# Patient Record
Sex: FEMALE | Race: OTHER | HISPANIC OR LATINO | URBAN - METROPOLITAN AREA
[De-identification: names, ages, dates, MRNs, and addresses within clinical notes are randomized per-mention and may not be internally consistent; named-entity substitution may affect disease eponyms.]

---

## 2017-08-15 ENCOUNTER — OUTPATIENT (OUTPATIENT)
Dept: OUTPATIENT SERVICES | Facility: HOSPITAL | Age: 32
LOS: 1 days | Discharge: HOME | End: 2017-08-15

## 2017-08-15 DIAGNOSIS — E78.00 PURE HYPERCHOLESTEROLEMIA, UNSPECIFIED: ICD-10-CM

## 2017-08-15 DIAGNOSIS — Z00.00 ENCOUNTER FOR GENERAL ADULT MEDICAL EXAMINATION WITHOUT ABNORMAL FINDINGS: ICD-10-CM

## 2017-08-15 DIAGNOSIS — E03.9 HYPOTHYROIDISM, UNSPECIFIED: ICD-10-CM

## 2018-06-11 ENCOUNTER — OUTPATIENT (OUTPATIENT)
Dept: OUTPATIENT SERVICES | Facility: HOSPITAL | Age: 33
LOS: 1 days | Discharge: HOME | End: 2018-06-11

## 2018-06-11 DIAGNOSIS — R76.11 NONSPECIFIC REACTION TO TUBERCULIN SKIN TEST WITHOUT ACTIVE TUBERCULOSIS: ICD-10-CM

## 2018-07-17 ENCOUNTER — OUTPATIENT (OUTPATIENT)
Dept: OUTPATIENT SERVICES | Facility: HOSPITAL | Age: 33
LOS: 1 days | Discharge: HOME | End: 2018-07-17

## 2018-07-17 DIAGNOSIS — R76.11 NONSPECIFIC REACTION TO TUBERCULIN SKIN TEST WITHOUT ACTIVE TUBERCULOSIS: ICD-10-CM

## 2018-08-20 ENCOUNTER — OUTPATIENT (OUTPATIENT)
Dept: OUTPATIENT SERVICES | Facility: HOSPITAL | Age: 33
LOS: 1 days | Discharge: HOME | End: 2018-08-20

## 2018-08-20 DIAGNOSIS — R76.11 NONSPECIFIC REACTION TO TUBERCULIN SKIN TEST WITHOUT ACTIVE TUBERCULOSIS: ICD-10-CM

## 2018-09-19 ENCOUNTER — OUTPATIENT (OUTPATIENT)
Dept: OUTPATIENT SERVICES | Facility: HOSPITAL | Age: 33
LOS: 1 days | Discharge: HOME | End: 2018-09-19

## 2018-09-19 DIAGNOSIS — R76.11 NONSPECIFIC REACTION TO TUBERCULIN SKIN TEST WITHOUT ACTIVE TUBERCULOSIS: ICD-10-CM

## 2018-11-03 ENCOUNTER — OUTPATIENT (OUTPATIENT)
Dept: OUTPATIENT SERVICES | Facility: HOSPITAL | Age: 33
LOS: 1 days | Discharge: HOME | End: 2018-11-03

## 2018-11-03 DIAGNOSIS — R76.11 NONSPECIFIC REACTION TO TUBERCULIN SKIN TEST WITHOUT ACTIVE TUBERCULOSIS: ICD-10-CM

## 2018-12-08 ENCOUNTER — OUTPATIENT (OUTPATIENT)
Dept: OUTPATIENT SERVICES | Facility: HOSPITAL | Age: 33
LOS: 1 days | Discharge: HOME | End: 2018-12-08

## 2018-12-08 DIAGNOSIS — R76.11 NONSPECIFIC REACTION TO TUBERCULIN SKIN TEST WITHOUT ACTIVE TUBERCULOSIS: ICD-10-CM

## 2019-01-29 ENCOUNTER — OUTPATIENT (OUTPATIENT)
Dept: OUTPATIENT SERVICES | Facility: HOSPITAL | Age: 34
LOS: 1 days | Discharge: HOME | End: 2019-01-29

## 2019-01-29 DIAGNOSIS — R76.11 NONSPECIFIC REACTION TO TUBERCULIN SKIN TEST WITHOUT ACTIVE TUBERCULOSIS: ICD-10-CM

## 2019-03-20 ENCOUNTER — OUTPATIENT (OUTPATIENT)
Dept: OUTPATIENT SERVICES | Facility: HOSPITAL | Age: 34
LOS: 1 days | Discharge: HOME | End: 2019-03-20

## 2019-03-20 DIAGNOSIS — R03.0 ELEVATED BLOOD-PRESSURE READING, WITHOUT DIAGNOSIS OF HYPERTENSION: ICD-10-CM

## 2019-03-20 DIAGNOSIS — E55.9 VITAMIN D DEFICIENCY, UNSPECIFIED: ICD-10-CM

## 2022-10-18 ENCOUNTER — NON-APPOINTMENT (OUTPATIENT)
Age: 37
End: 2022-10-18

## 2022-10-18 ENCOUNTER — APPOINTMENT (OUTPATIENT)
Dept: ORTHOPEDIC SURGERY | Facility: CLINIC | Age: 37
End: 2022-10-18

## 2022-10-18 VITALS — WEIGHT: 152 LBS | HEIGHT: 62 IN | BODY MASS INDEX: 27.97 KG/M2

## 2022-10-18 PROBLEM — Z00.00 ENCOUNTER FOR PREVENTIVE HEALTH EXAMINATION: Status: ACTIVE | Noted: 2022-10-18

## 2022-10-18 PROCEDURE — 99072 ADDL SUPL MATRL&STAF TM PHE: CPT | Mod: ACP

## 2022-10-18 PROCEDURE — 99203 OFFICE O/P NEW LOW 30 MIN: CPT | Mod: ACP

## 2022-10-18 NOTE — ASSESSMENT
[FreeTextEntry1] :  At this time discussed with Dr. Marino.  Patient is to continue with Silvadene, dressing changes.  Recommending consult and follow-up with burn at [facility].  Referred to Dr. Bryson or Dr. Whitney.  Not working until follow-up with Burn.  Total temporary disability.\par \par   Patient was seen under the supervision of Dr. Marino.

## 2022-10-18 NOTE — IMAGING
[de-identified] :   Examination of the right wrist small circular superficial burn is noted on the volar radial wrist, with erythema.  No signs of infection.  It is approximately the size of a nickel.  Patient is able to actively flex and extend the wrist although with discomfort.  She is able to make a full fist.  Neurovascularly intact.  Full range of motion of the elbow.\par \par X-rays deferred
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
## 2022-10-20 ENCOUNTER — OUTPATIENT (OUTPATIENT)
Dept: OUTPATIENT SERVICES | Facility: HOSPITAL | Age: 37
LOS: 1 days | Discharge: HOME | End: 2022-10-20

## 2022-10-20 ENCOUNTER — APPOINTMENT (OUTPATIENT)
Dept: BURN CARE | Facility: CLINIC | Age: 37
End: 2022-10-20
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
## 2022-10-20 DIAGNOSIS — T23.271D BURN OF SECOND DEGREE OF RIGHT WRIST, SUBSEQUENT ENCOUNTER: ICD-10-CM

## 2022-10-20 DIAGNOSIS — T31.0 BURNS INVOLVING LESS THAN 10% OF BODY SURFACE: ICD-10-CM

## 2022-10-20 PROCEDURE — 99202 OFFICE O/P NEW SF 15 MIN: CPT
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
## 2022-10-20 NOTE — PHYSICAL EXAM
[Healing] : healing [Size%: ______] : Size: [unfilled]% [Infected?] : Infected: No [5] : 5 out of 10 [Abnormal] : abnormal [Medium] : medium [] : no [de-identified] : 2nd degree burn volar wrist 1x1cm--> healing -> local wound care \par \par follow up 1 week \par \par c/o pain with movement right 3rd finger--. OT/PT [TWNoteComboBox1] : bandaid
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
## 2022-10-20 NOTE — REASON FOR VISIT
[Initial] : initial visit [Were you admitted to the burn center at [facility]?] : not admitted to the burn center at [facility]

## 2022-10-27 ENCOUNTER — OUTPATIENT (OUTPATIENT)
Dept: OUTPATIENT SERVICES | Facility: HOSPITAL | Age: 37
LOS: 1 days | Discharge: HOME | End: 2022-10-27

## 2022-10-27 ENCOUNTER — APPOINTMENT (OUTPATIENT)
Dept: BURN CARE | Facility: CLINIC | Age: 37
End: 2022-10-27

## 2022-10-27 VITALS — OXYGEN SATURATION: 99 % | HEART RATE: 102 BPM | DIASTOLIC BLOOD PRESSURE: 85 MMHG | SYSTOLIC BLOOD PRESSURE: 123 MMHG

## 2022-10-27 DIAGNOSIS — T23.271S: ICD-10-CM
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
## 2022-10-27 DIAGNOSIS — M79.644 PAIN IN RIGHT FINGER(S): ICD-10-CM
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
## 2022-10-27 DIAGNOSIS — M25.531 PAIN IN RIGHT WRIST: ICD-10-CM

## 2022-10-27 PROCEDURE — 99212 OFFICE O/P EST SF 10 MIN: CPT
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
## 2022-10-27 NOTE — ASSESSMENT
[FreeTextEntry1] : 2nd degree burn volar wrist 1x1cm--> healed -> local wound care \par \par follow up 1 week \par \par c/o pain with movement right 3rd finger--. OT/PT [Wound Care] : wound care

## 2022-10-27 NOTE — REASON FOR VISIT
[Revisit] : revisit [Were you admitted to the burn center at [facility]?] : not admitted to the burn center at [facility]

## 2022-10-27 NOTE — PHYSICAL EXAM
[Closed] : closed [Size%: ______] : Size: [unfilled]% [Infected?] : Infected: No [5] : 5 out of 10 [Abnormal] : abnormal [None] : none [] : no [de-identified] : therapy [de-identified] : 2nd degree burn volar wrist 1x1cm--> healed -> local wound care \par \par follow up 1 week \par \par c/o pain with movement right 3rd finger--. OT/PT [TWNoteComboBox1] : False

## 2022-11-03 ENCOUNTER — OUTPATIENT (OUTPATIENT)
Dept: OUTPATIENT SERVICES | Facility: HOSPITAL | Age: 37
LOS: 1 days | Discharge: HOME | End: 2022-11-03

## 2022-11-03 ENCOUNTER — APPOINTMENT (OUTPATIENT)
Dept: BURN CARE | Facility: CLINIC | Age: 37
End: 2022-11-03
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
## 2022-11-03 DIAGNOSIS — T23.271D BURN OF SECOND DEGREE OF RIGHT WRIST, SUBSEQUENT ENCOUNTER: ICD-10-CM
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
## 2022-11-03 DIAGNOSIS — M79.644 PAIN IN RIGHT FINGER(S): ICD-10-CM

## 2022-11-03 PROCEDURE — 99212 OFFICE O/P EST SF 10 MIN: CPT

## 2022-11-03 NOTE — PHYSICAL EXAM
[Closed] : closed [Size%: ______] : Size: [unfilled]% [Infected?] : Infected: No [5] : 5 out of 10 [Abnormal] : abnormal [Small] : small  [] : no [de-identified] : therapy/ neurology [de-identified] : 2nd degree burn volar wrist 1x1cm--> healed -> local wound care \par \par follow up 2 week \par \par c/o pain with movement right 3rd finger--. OT/PT--> neurology [TWNoteComboBox1] : bandaid

## 2022-11-03 NOTE — ASSESSMENT
[FreeTextEntry1] : 2nd degree burn volar wrist 1x1cm--> healed -> local wound care \par \par follow up 2 week \par \par c/o pain with movement right 3rd finger--. OT/PT--> neurology [Wound Care] : wound care

## 2022-11-03 NOTE — REASON FOR VISIT
[Revisit] : revisit [Were you admitted to the burn center at [facility]?] : not admitted to the burn center at [facility]
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
## 2022-11-17 ENCOUNTER — APPOINTMENT (OUTPATIENT)
Dept: BURN CARE | Facility: CLINIC | Age: 37
End: 2022-11-17

## 2022-11-17 ENCOUNTER — OUTPATIENT (OUTPATIENT)
Dept: OUTPATIENT SERVICES | Facility: HOSPITAL | Age: 37
LOS: 1 days | Discharge: HOME | End: 2022-11-17

## 2022-11-17 VITALS
OXYGEN SATURATION: 98 % | SYSTOLIC BLOOD PRESSURE: 141 MMHG | DIASTOLIC BLOOD PRESSURE: 95 MMHG | HEIGHT: 62 IN | HEART RATE: 98 BPM

## 2022-11-17 DIAGNOSIS — T23.271S: ICD-10-CM
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
## 2022-11-17 DIAGNOSIS — M79.644 PAIN IN RIGHT FINGER(S): ICD-10-CM
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
## 2022-11-17 DIAGNOSIS — M25.531 PAIN IN RIGHT WRIST: ICD-10-CM
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
## 2022-11-17 PROCEDURE — 99213 OFFICE O/P EST LOW 20 MIN: CPT
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
## 2022-11-17 NOTE — PHYSICAL EXAM
[Closed] : closed [Size%: ______] : Size: [unfilled]% [Infected?] : Infected: No [5] : 5 out of 10 [Abnormal] : abnormal [None] : none [] : no [de-identified] : therapy/ neurology [de-identified] : 2nd degree burn volar wrist 1x1cm--> healed -> local wound care \par \par follow up 4 week \par \par c/o pain with movement right 3rd finger--. OT/PT--> neurology\par \par as per pt -> emg with neurology to follow\par \par follow up 1-2 months  [TWNoteComboBox1] : False

## 2022-11-17 NOTE — REASON FOR VISIT
[Revisit] : revisit [Were you admitted to the burn center at [facility]?] : not admitted to the burn center at [facility]

## 2022-12-15 ENCOUNTER — OUTPATIENT (OUTPATIENT)
Dept: OUTPATIENT SERVICES | Facility: HOSPITAL | Age: 37
LOS: 1 days | Discharge: HOME | End: 2022-12-15

## 2022-12-15 ENCOUNTER — APPOINTMENT (OUTPATIENT)
Dept: BURN CARE | Facility: CLINIC | Age: 37
End: 2022-12-15

## 2022-12-15 VITALS — SYSTOLIC BLOOD PRESSURE: 156 MMHG | HEART RATE: 92 BPM | DIASTOLIC BLOOD PRESSURE: 96 MMHG

## 2022-12-15 DIAGNOSIS — T31.0 BURNS INVOLVING LESS THAN 10% OF BODY SURFACE: ICD-10-CM

## 2022-12-15 DIAGNOSIS — L91.8 OTHER HYPERTROPHIC DISORDERS OF THE SKIN: ICD-10-CM

## 2022-12-15 DIAGNOSIS — T23.271S: ICD-10-CM
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
## 2022-12-15 DIAGNOSIS — M25.531 PAIN IN RIGHT WRIST: ICD-10-CM

## 2022-12-15 DIAGNOSIS — M79.644 PAIN IN RIGHT FINGER(S): ICD-10-CM

## 2022-12-15 PROCEDURE — 99212 OFFICE O/P EST SF 10 MIN: CPT
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
## 2022-12-15 NOTE — REASON FOR VISIT
[Revisit] : revisit [Were you admitted to the burn center at [facility]?] : not admitted to the burn center at [facility]

## 2022-12-15 NOTE — PHYSICAL EXAM
[Closed] : closed [Size%: ______] : Size: [unfilled]% [Infected?] : Infected: No [5] : 5 out of 10 [Abnormal] : abnormal [None] : none [] : no [de-identified] : therapy/ neurology [de-identified] : The 1% TBSA 2nd degree burn volar wrist measures 1x1cm and is healed.  The extremity is warm and has no edema. There are areas of hypopigmentation.  The patient was instructed to clean  the wound with soap and water. Continue local wound care with moisturizer. Follow up neurology.  Follow up 1-2 months.
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
## 2023-01-26 ENCOUNTER — APPOINTMENT (OUTPATIENT)
Dept: BURN CARE | Facility: CLINIC | Age: 38
End: 2023-01-26
Payer: OTHER MISCELLANEOUS

## 2023-01-26 ENCOUNTER — OUTPATIENT (OUTPATIENT)
Dept: OUTPATIENT SERVICES | Facility: HOSPITAL | Age: 38
LOS: 1 days | Discharge: HOME | End: 2023-01-26

## 2023-01-26 VITALS — DIASTOLIC BLOOD PRESSURE: 106 MMHG | HEART RATE: 94 BPM | SYSTOLIC BLOOD PRESSURE: 147 MMHG

## 2023-01-26 PROCEDURE — 99212 OFFICE O/P EST SF 10 MIN: CPT

## 2023-01-26 NOTE — REASON FOR VISIT
[Revisit] : revisit [Were you admitted to the burn center at [facility]?] : not admitted to the burn center at [facility]
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
## 2023-01-26 NOTE — PHYSICAL EXAM
[Closed] : closed [Size%: ______] : Size: [unfilled]% [Infected?] : Infected: No [5] : 5 out of 10 [Abnormal] : abnormal [None] : none [] : no [de-identified] : therapy/ neurology [de-identified] : The 1% TBSA 2nd degree burn volar wrist measures 1x1cm and is healed.  The patient is wearing a right arm and hand brace. The extremity is warm and has no edema. There are areas of hypopigmentation.  The patient was instructed to clean  the wound with soap and water. Continue local wound care with moisturizer. Follow up neurology.  .  The 1% TBSA 2nd degree burn abdomen is healed.  The wound is pink and dry. The patient was instructed to clean  the wound with soap and water. Continue local wound care with moisturizer and sunscreen. Follow up 1-2 months.
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
## 2023-02-02 DIAGNOSIS — T23.271S: ICD-10-CM

## 2023-02-02 DIAGNOSIS — T31.0 BURNS INVOLVING LESS THAN 10% OF BODY SURFACE: ICD-10-CM

## 2023-02-02 DIAGNOSIS — L81.8 OTHER SPECIFIED DISORDERS OF PIGMENTATION: ICD-10-CM

## 2023-03-06 ENCOUNTER — APPOINTMENT (OUTPATIENT)
Dept: ORTHOPEDIC SURGERY | Facility: CLINIC | Age: 38
End: 2023-03-06
Payer: OTHER MISCELLANEOUS

## 2023-03-06 ENCOUNTER — NON-APPOINTMENT (OUTPATIENT)
Age: 38
End: 2023-03-06

## 2023-03-06 VITALS — HEIGHT: 62 IN | BODY MASS INDEX: 27.97 KG/M2 | WEIGHT: 152 LBS

## 2023-03-06 PROCEDURE — 99212 OFFICE O/P EST SF 10 MIN: CPT
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
## 2023-03-06 NOTE — PHYSICAL EXAM
[de-identified] : Patient has a healed burn across the volar aspect of the right wrist.  Proximal to the wrist crease she does have sensitivity about the fingers.  Has positive Tinel's and median nerve compression test she has stiffness of the fingers she holds the hand and defensive type of posture.  He has normal capillary refill.

## 2023-03-06 NOTE — ASSESSMENT
[FreeTextEntry1] : Patient had a burn to the right hand she has testing that is reported to show carpal tunnel syndrome on the right side.  I think she has sensitivity about the hand and wrist area.  I think she needs an occupational therapy program for therapy to the hand desensitization program making sure that she is using the hand was discussed as well she will see me back in 6 weeks with a repeat evaluation of her right hand.  Occupational therapy is being requested.  She does not require carpal tunnel release at this time.

## 2023-03-23 ENCOUNTER — APPOINTMENT (OUTPATIENT)
Dept: BURN CARE | Facility: CLINIC | Age: 38
End: 2023-03-23

## 2023-03-23 ENCOUNTER — APPOINTMENT (OUTPATIENT)
Dept: BURN CARE | Facility: CLINIC | Age: 38
End: 2023-03-23
Payer: OTHER MISCELLANEOUS

## 2023-03-23 ENCOUNTER — TRANSCRIPTION ENCOUNTER (OUTPATIENT)
Age: 38
End: 2023-03-23

## 2023-03-23 ENCOUNTER — OUTPATIENT (OUTPATIENT)
Dept: OUTPATIENT SERVICES | Facility: HOSPITAL | Age: 38
LOS: 1 days | End: 2023-03-23
Payer: COMMERCIAL

## 2023-03-23 VITALS — TEMPERATURE: 97.3 F | SYSTOLIC BLOOD PRESSURE: 133 MMHG | DIASTOLIC BLOOD PRESSURE: 95 MMHG | HEART RATE: 94 BPM

## 2023-03-23 DIAGNOSIS — Z00.8 ENCOUNTER FOR OTHER GENERAL EXAMINATION: ICD-10-CM

## 2023-03-23 PROCEDURE — 99212 OFFICE O/P EST SF 10 MIN: CPT

## 2023-03-23 NOTE — PHYSICAL EXAM
[Closed] : closed [Size%: ______] : Size: [unfilled]% [5] : 5 out of 10 [Abnormal] : abnormal [None] : none [Infected?] : Infected: No [] : no [de-identified] : therapy/ neurology [de-identified] : <1% TBSA 2nd degree burn volar wrist measures 1x1cm and is is fully healed -  minimal hyperpigmentation - no scarring/ hypertrophy.\par . \par Abdomen - healed dry site with scattered small areas of hypo and hyper pigmentation.\par Continue moisturizer to site

## 2023-03-23 NOTE — REASON FOR VISIT
[Revisit] : revisit [Were you admitted to the burn center at [facility]?] : not admitted to the burn center at [facility]
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
## 2023-03-24 DIAGNOSIS — T23.271S: ICD-10-CM

## 2023-03-24 DIAGNOSIS — T23.271D BURN OF SECOND DEGREE OF RIGHT WRIST, SUBSEQUENT ENCOUNTER: ICD-10-CM

## 2023-03-24 DIAGNOSIS — T31.0 BURNS INVOLVING LESS THAN 10% OF BODY SURFACE: ICD-10-CM

## 2023-03-24 DIAGNOSIS — L81.8 OTHER SPECIFIED DISORDERS OF PIGMENTATION: ICD-10-CM
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
## 2023-04-11 ENCOUNTER — FORM ENCOUNTER (OUTPATIENT)
Age: 38
End: 2023-04-11

## 2023-04-17 ENCOUNTER — NON-APPOINTMENT (OUTPATIENT)
Age: 38
End: 2023-04-17

## 2023-04-17 ENCOUNTER — APPOINTMENT (OUTPATIENT)
Dept: ORTHOPEDIC SURGERY | Facility: CLINIC | Age: 38
End: 2023-04-17
Payer: OTHER MISCELLANEOUS

## 2023-04-17 VITALS — BODY MASS INDEX: 27.97 KG/M2 | HEIGHT: 62 IN | WEIGHT: 152 LBS

## 2023-04-17 PROCEDURE — 99213 OFFICE O/P EST LOW 20 MIN: CPT

## 2023-04-17 NOTE — PHYSICAL EXAM
[de-identified] : Patient sensitivity at the tips of the fingers on the right hand she has good capillary refill he has limited grasp strength she has no swelling erythema ecchymoses or abrasions.  The burn that is proximal to this level is healing.  There is still redness associated with it.  There is a positive Tinel's and median nerve compression test.

## 2023-04-17 NOTE — ADDENDUM
[FreeTextEntry1] : I reviewed her EMG test that shows bilateral carpal tunnel syndrome.  They are saying mild on the right and mild to moderate on the left she is only symptomatic on the right
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
## 2023-04-17 NOTE — HISTORY OF PRESENT ILLNESS
[de-identified] : 38-year-old female still with pain discomfort in her right hand.  He has numbness and tingling to fingers she is starting an occupational therapy program this will help with to try to desensitize the fingers.  She will continue to do this program she is still not working totally disabled.

## 2023-04-18 ENCOUNTER — FORM ENCOUNTER (OUTPATIENT)
Age: 38
End: 2023-04-18

## 2023-04-27 ENCOUNTER — OUTPATIENT (OUTPATIENT)
Dept: OUTPATIENT SERVICES | Facility: HOSPITAL | Age: 38
LOS: 1 days | End: 2023-04-27
Payer: COMMERCIAL

## 2023-04-27 VITALS
HEIGHT: 63 IN | DIASTOLIC BLOOD PRESSURE: 79 MMHG | TEMPERATURE: 98 F | HEART RATE: 70 BPM | OXYGEN SATURATION: 100 % | SYSTOLIC BLOOD PRESSURE: 143 MMHG | RESPIRATION RATE: 16 BRPM | WEIGHT: 165.35 LBS

## 2023-04-27 DIAGNOSIS — G56.01 CARPAL TUNNEL SYNDROME, RIGHT UPPER LIMB: ICD-10-CM

## 2023-04-27 DIAGNOSIS — Z90.3 ACQUIRED ABSENCE OF STOMACH [PART OF]: Chronic | ICD-10-CM

## 2023-04-27 DIAGNOSIS — Z01.818 ENCOUNTER FOR OTHER PREPROCEDURAL EXAMINATION: ICD-10-CM

## 2023-04-27 LAB
ALBUMIN SERPL ELPH-MCNC: 4.7 G/DL — SIGNIFICANT CHANGE UP (ref 3.5–5.2)
ALP SERPL-CCNC: 56 U/L — SIGNIFICANT CHANGE UP (ref 30–115)
ALT FLD-CCNC: 12 U/L — SIGNIFICANT CHANGE UP (ref 0–41)
ANION GAP SERPL CALC-SCNC: 10 MMOL/L — SIGNIFICANT CHANGE UP (ref 7–14)
AST SERPL-CCNC: 15 U/L — SIGNIFICANT CHANGE UP (ref 0–41)
BASOPHILS # BLD AUTO: 0.05 K/UL — SIGNIFICANT CHANGE UP (ref 0–0.2)
BASOPHILS NFR BLD AUTO: 0.7 % — SIGNIFICANT CHANGE UP (ref 0–1)
BILIRUB SERPL-MCNC: 0.4 MG/DL — SIGNIFICANT CHANGE UP (ref 0.2–1.2)
BUN SERPL-MCNC: 14 MG/DL — SIGNIFICANT CHANGE UP (ref 10–20)
CALCIUM SERPL-MCNC: 9.8 MG/DL — SIGNIFICANT CHANGE UP (ref 8.4–10.5)
CHLORIDE SERPL-SCNC: 104 MMOL/L — SIGNIFICANT CHANGE UP (ref 98–110)
CO2 SERPL-SCNC: 26 MMOL/L — SIGNIFICANT CHANGE UP (ref 17–32)
CREAT SERPL-MCNC: 0.6 MG/DL — LOW (ref 0.7–1.5)
EGFR: 116 ML/MIN/1.73M2 — SIGNIFICANT CHANGE UP
EOSINOPHIL # BLD AUTO: 0.03 K/UL — SIGNIFICANT CHANGE UP (ref 0–0.7)
EOSINOPHIL NFR BLD AUTO: 0.4 % — SIGNIFICANT CHANGE UP (ref 0–8)
GLUCOSE SERPL-MCNC: 85 MG/DL — SIGNIFICANT CHANGE UP (ref 70–99)
HCT VFR BLD CALC: 37.5 % — SIGNIFICANT CHANGE UP (ref 37–47)
HGB BLD-MCNC: 12.6 G/DL — SIGNIFICANT CHANGE UP (ref 12–16)
IMM GRANULOCYTES NFR BLD AUTO: 0.4 % — HIGH (ref 0.1–0.3)
LYMPHOCYTES # BLD AUTO: 1.72 K/UL — SIGNIFICANT CHANGE UP (ref 1.2–3.4)
LYMPHOCYTES # BLD AUTO: 24.6 % — SIGNIFICANT CHANGE UP (ref 20.5–51.1)
MCHC RBC-ENTMCNC: 32 PG — HIGH (ref 27–31)
MCHC RBC-ENTMCNC: 33.6 G/DL — SIGNIFICANT CHANGE UP (ref 32–37)
MCV RBC AUTO: 95.2 FL — SIGNIFICANT CHANGE UP (ref 81–99)
MONOCYTES # BLD AUTO: 0.62 K/UL — HIGH (ref 0.1–0.6)
MONOCYTES NFR BLD AUTO: 8.9 % — SIGNIFICANT CHANGE UP (ref 1.7–9.3)
NEUTROPHILS # BLD AUTO: 4.53 K/UL — SIGNIFICANT CHANGE UP (ref 1.4–6.5)
NEUTROPHILS NFR BLD AUTO: 65 % — SIGNIFICANT CHANGE UP (ref 42.2–75.2)
NRBC # BLD: 0 /100 WBCS — SIGNIFICANT CHANGE UP (ref 0–0)
PLATELET # BLD AUTO: 257 K/UL — SIGNIFICANT CHANGE UP (ref 130–400)
PMV BLD: 11.6 FL — HIGH (ref 7.4–10.4)
POTASSIUM SERPL-MCNC: 4.1 MMOL/L — SIGNIFICANT CHANGE UP (ref 3.5–5)
POTASSIUM SERPL-SCNC: 4.1 MMOL/L — SIGNIFICANT CHANGE UP (ref 3.5–5)
PROT SERPL-MCNC: 7.3 G/DL — SIGNIFICANT CHANGE UP (ref 6–8)
RBC # BLD: 3.94 M/UL — LOW (ref 4.2–5.4)
RBC # FLD: 12.7 % — SIGNIFICANT CHANGE UP (ref 11.5–14.5)
SODIUM SERPL-SCNC: 140 MMOL/L — SIGNIFICANT CHANGE UP (ref 135–146)
WBC # BLD: 6.98 K/UL — SIGNIFICANT CHANGE UP (ref 4.8–10.8)
WBC # FLD AUTO: 6.98 K/UL — SIGNIFICANT CHANGE UP (ref 4.8–10.8)

## 2023-04-27 PROCEDURE — 36415 COLL VENOUS BLD VENIPUNCTURE: CPT

## 2023-04-27 PROCEDURE — 99214 OFFICE O/P EST MOD 30 MIN: CPT | Mod: 25

## 2023-04-27 PROCEDURE — 80053 COMPREHEN METABOLIC PANEL: CPT

## 2023-04-27 PROCEDURE — 85025 COMPLETE CBC W/AUTO DIFF WBC: CPT
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
## 2023-04-28 DIAGNOSIS — G56.01 CARPAL TUNNEL SYNDROME, RIGHT UPPER LIMB: ICD-10-CM

## 2023-04-28 DIAGNOSIS — Z01.818 ENCOUNTER FOR OTHER PREPROCEDURAL EXAMINATION: ICD-10-CM

## 2023-05-09 NOTE — ASU PATIENT PROFILE, ADULT - FALL HARM RISK - UNIVERSAL INTERVENTIONS
Bed in lowest position, wheels locked, appropriate side rails in place/Call bell, personal items and telephone in reach/Instruct patient to call for assistance before getting out of bed or chair/Non-slip footwear when patient is out of bed/[place] to call system/Physically safe environment - no spills, clutter or unnecessary equipment/Purposeful Proactive Rounding/Room/bathroom lighting operational, light cord in reach

## 2023-05-10 ENCOUNTER — OUTPATIENT (OUTPATIENT)
Dept: INPATIENT UNIT | Facility: HOSPITAL | Age: 38
LOS: 1 days | Discharge: HOME | End: 2023-05-10
Payer: OTHER MISCELLANEOUS

## 2023-05-10 ENCOUNTER — APPOINTMENT (OUTPATIENT)
Dept: ORTHOPEDIC SURGERY | Facility: AMBULATORY SURGERY CENTER | Age: 38
End: 2023-05-10

## 2023-05-10 ENCOUNTER — TRANSCRIPTION ENCOUNTER (OUTPATIENT)
Age: 38
End: 2023-05-10

## 2023-05-10 VITALS
WEIGHT: 165.35 LBS | HEIGHT: 63 IN | TEMPERATURE: 98 F | SYSTOLIC BLOOD PRESSURE: 119 MMHG | RESPIRATION RATE: 18 BRPM | HEART RATE: 80 BPM | DIASTOLIC BLOOD PRESSURE: 78 MMHG | OXYGEN SATURATION: 100 %

## 2023-05-10 VITALS
DIASTOLIC BLOOD PRESSURE: 85 MMHG | SYSTOLIC BLOOD PRESSURE: 137 MMHG | RESPIRATION RATE: 16 BRPM | HEART RATE: 87 BPM | OXYGEN SATURATION: 99 %

## 2023-05-10 DIAGNOSIS — Z90.3 ACQUIRED ABSENCE OF STOMACH [PART OF]: Chronic | ICD-10-CM

## 2023-05-10 DIAGNOSIS — G56.01 CARPAL TUNNEL SYNDROME, RIGHT UPPER LIMB: ICD-10-CM

## 2023-05-10 PROCEDURE — 64721 CARPAL TUNNEL SURGERY: CPT | Mod: RT

## 2023-05-10 RX ORDER — HYDROMORPHONE HYDROCHLORIDE 2 MG/ML
0.5 INJECTION INTRAMUSCULAR; INTRAVENOUS; SUBCUTANEOUS
Refills: 0 | Status: DISCONTINUED | OUTPATIENT
Start: 2023-05-10 | End: 2023-05-10

## 2023-05-10 RX ORDER — IBUPROFEN 200 MG
1 TABLET ORAL
Qty: 20 | Refills: 0
Start: 2023-05-10

## 2023-05-10 RX ORDER — ZOLPIDEM TARTRATE 10 MG/1
1 TABLET ORAL
Refills: 0 | DISCHARGE

## 2023-05-10 RX ORDER — IBUPROFEN 200 MG
1 TABLET ORAL
Refills: 0 | DISCHARGE

## 2023-05-10 RX ORDER — GABAPENTIN 400 MG/1
2 CAPSULE ORAL
Refills: 0 | DISCHARGE

## 2023-05-10 RX ORDER — OXYCODONE HYDROCHLORIDE 5 MG/1
5 TABLET ORAL ONCE
Refills: 0 | Status: DISCONTINUED | OUTPATIENT
Start: 2023-05-10 | End: 2023-05-10

## 2023-05-10 RX ORDER — CLONAZEPAM 1 MG
1 TABLET ORAL
Refills: 0 | DISCHARGE

## 2023-05-10 RX ORDER — ONDANSETRON 8 MG/1
4 TABLET, FILM COATED ORAL ONCE
Refills: 0 | Status: DISCONTINUED | OUTPATIENT
Start: 2023-05-10 | End: 2023-05-10

## 2023-05-10 RX ORDER — SODIUM CHLORIDE 9 MG/ML
1000 INJECTION, SOLUTION INTRAVENOUS
Refills: 0 | Status: DISCONTINUED | OUTPATIENT
Start: 2023-05-10 | End: 2023-05-10

## 2023-05-10 RX ORDER — PHENTERMINE HCL 30 MG
1 CAPSULE ORAL
Refills: 0 | DISCHARGE

## 2023-05-10 RX ORDER — NORETHINDRONE AND ETHINYL ESTRADIOL 0.4-0.035
1 KIT ORAL
Refills: 0 | DISCHARGE

## 2023-05-10 NOTE — PRE-ANESTHESIA EVALUATION ADULT - NSANTHPMHFT_GEN_ALL_CORE
[FreeTextEntry1] : Sushila is a 14 year old girl with history of a right breast fibroadenoma, who presents today with a right axillary lymph node.  She is asymptomatic both locally and systemically.  On exam, the node is soft, mobile and reassuring.  She had an ultrasound of the region that demonstrated at 10:00, 10 cm from nipple in the right axillary tail at the site of the palpable area of concern per patient is a morphologically normal-appearing intramammary lymph node measuring 13 x 5 x 11 mm.  This was visualized on ultrasound as well back in 10/2019 and is completely stable in size and appearance since tis time.  At the 12:00 position, 5 cm from nipple the previously reported mass is not seen in keeping with history of interval surgery.  The left breast does not have any abnormalities.  This was deemed BIRADS-2 by the breast radiologist and they do not recommend any further studies.    I reviewed these results with Sushila and mom and offered reassurance.  I discussed that this has been present for over two years and has not changed in size or characteristic and continues to appear like a normal lymph node.  I discussed treatment options including continued observation versus resection.  Given its stability over a significant period of time, Sushila and mom are in agreement to hold off on any intervention.  They understand that this is the only way to make a definitive diagnosis but are reassured by the imaging findings.  I have encouraged Sushila to continue to monitor the node and they know to contact me with any changes, new symptoms or any further questions or concerns.  Sushila can return to see me on an as needed basis.  
h/o HTN, resolved after gastric sleeve  METS>4 without CP/palpitations/sob  Denies orthopnea

## 2023-05-10 NOTE — CHART NOTE - NSCHARTNOTEFT_GEN_A_CORE
PACU ANESTHESIA ADMISSION NOTE      Procedure:   Post op diagnosis:      ____  Intubated  TV:______       Rate: ______      FiO2: ______    _x___  Patent Airway    __x__  Full return of protective reflexes    _x___  Full recovery from anesthesia / back to baseline     Vitals:   T: 98          R: 13                 BP: 112/78                 Sat: 99                  P: 80      Mental Status:  ___x_ Awake   __x___ Alert   _____ Drowsy   _____ Sedated    Nausea/Vomiting:  __x__ NO  ______Yes,   See Post - Op Orders          Pain Scale (0-10):  _____    Treatment: __x__ None    ____ See Post - Op/PCA Orders    Post - Operative Fluids:   ____ Oral   _x___ See Post - Op Orders    Plan: Discharge:   ____Home       __x___Floor     _____Critical Care    _____  Other:_________________    Comments:

## 2023-05-15 DIAGNOSIS — Z91.040 LATEX ALLERGY STATUS: ICD-10-CM

## 2023-05-15 DIAGNOSIS — F41.9 ANXIETY DISORDER, UNSPECIFIED: ICD-10-CM

## 2023-05-15 DIAGNOSIS — G56.01 CARPAL TUNNEL SYNDROME, RIGHT UPPER LIMB: ICD-10-CM

## 2023-05-15 DIAGNOSIS — Z98.84 BARIATRIC SURGERY STATUS: ICD-10-CM

## 2023-05-15 DIAGNOSIS — J45.909 UNSPECIFIED ASTHMA, UNCOMPLICATED: ICD-10-CM

## 2023-05-15 DIAGNOSIS — Z86.79 PERSONAL HISTORY OF OTHER DISEASES OF THE CIRCULATORY SYSTEM: ICD-10-CM

## 2023-05-15 DIAGNOSIS — Z79.1 LONG TERM (CURRENT) USE OF NON-STEROIDAL ANTI-INFLAMMATORIES (NSAID): ICD-10-CM

## 2023-05-18 ENCOUNTER — NON-APPOINTMENT (OUTPATIENT)
Age: 38
End: 2023-05-18

## 2023-05-18 ENCOUNTER — APPOINTMENT (OUTPATIENT)
Dept: ORTHOPEDIC SURGERY | Facility: CLINIC | Age: 38
End: 2023-05-18
Payer: OTHER MISCELLANEOUS

## 2023-05-18 PROCEDURE — 99024 POSTOP FOLLOW-UP VISIT: CPT

## 2023-05-18 NOTE — PHYSICAL EXAM
[de-identified] : Examination of her right hand and wrist she has mild residual swelling and ecchymosis.  The incision is healing well, sutures are in place, there is no surrounding erythema or drainage from the wound.  She has tenderness to palpation over the volar aspect of the wrist and around the incision.  She is able to open and close her fist but has some decreased range of motion due to pain.  Sensation is intact all the fingers, 2+ radial pulse.\par \par

## 2023-05-18 NOTE — DISCUSSION/SUMMARY
[de-identified] : At this time the sutures were removed, patient tolerated this well.\par At this point continue working on range of motion and scar massage.\par She would like to continue doing OT, so I have given her a new prescription for this.  We will see her back in a few weeks for repeat evaluation since she is still unable to return to work.\par Patient will call me if any other problems or concerns.  Patient verbalized understanding and agreed with the plan, all questions were answered in the office today.\par \par This is a Worker's Compensation case, she is unable to work at this time.  She is currently on temporary total disability.

## 2023-05-18 NOTE — HISTORY OF PRESENT ILLNESS
[de-identified] : 38-year-old female is here today for her first postop appointment.  She is status post right open carpal tunnel release 5/10/2023 with Dr. Marino.  She states she is still having a lot of pain in her hand.  She states she is getting no relief with the ibuprofen, she spoke with SOFÍA Montano and he sent a prescription for Percocet.  She states she been taking that as needed.

## 2023-05-30 ENCOUNTER — APPOINTMENT (OUTPATIENT)
Dept: ORTHOPEDIC SURGERY | Facility: CLINIC | Age: 38
End: 2023-05-30
Payer: OTHER MISCELLANEOUS

## 2023-05-30 ENCOUNTER — NON-APPOINTMENT (OUTPATIENT)
Age: 38
End: 2023-05-30

## 2023-05-30 PROCEDURE — 99024 POSTOP FOLLOW-UP VISIT: CPT

## 2023-05-31 PROBLEM — F41.9 ANXIETY DISORDER, UNSPECIFIED: Chronic | Status: ACTIVE | Noted: 2023-04-27

## 2023-05-31 PROBLEM — J45.909 UNSPECIFIED ASTHMA, UNCOMPLICATED: Chronic | Status: ACTIVE | Noted: 2023-04-27

## 2023-06-06 ENCOUNTER — FORM ENCOUNTER (OUTPATIENT)
Age: 38
End: 2023-06-06

## 2023-06-06 ENCOUNTER — APPOINTMENT (OUTPATIENT)
Dept: PAIN MANAGEMENT | Facility: CLINIC | Age: 38
End: 2023-06-06
Payer: OTHER MISCELLANEOUS

## 2023-06-06 VITALS — HEIGHT: 62 IN | WEIGHT: 152 LBS | BODY MASS INDEX: 27.97 KG/M2

## 2023-06-06 PROCEDURE — 99245 OFF/OP CONSLTJ NEW/EST HI 55: CPT

## 2023-06-06 PROCEDURE — 96102W: CUSTOM
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
## 2023-06-06 NOTE — DATA REVIEWED
[FreeTextEntry1] : SOAPP: Scored a 0 , low risk.\par  \par [place]ISTRY: Reviewed .  \par  \par UDS: No data obtained today. \par   \par Medications that trigger a UDS: Benzodiazepines (Ativan, Xanax, Valium) etc, Barbiturates, Narcotics (Avinza, Butrans, hydrocodone, Codeine, Arlene, Methadone, Morphine, MS Contin, Opana, oxycodone, Oxycontin, Suboxone etc), Pregabalin (Lyrica), Tramadol (Ultacet, Utram etc), Tapentadol, (Nucynta) and Elist Drugs (cocaine, THC, Etc.)\par  \par Risk factors: Bipolar Illness, positive for any an illicit drugs, history of any ETOH and drug abuse, any signs of diversion, Sharing Meds, selling meds. Non consistent [place] drug reporting and above 120meq of morphine\par  \par Low risk: Patient has combination of a low risk SOAP and no risk factors. UDS would be repeated randomly every quarter

## 2023-06-06 NOTE — PHYSICAL EXAM
[de-identified] : Right wrist – carpal tunnel scar well healed. Mild welling of the hand noted. Wrist ROM slight decreased. Mild hypersensitivity noted over the scar.

## 2023-06-12 ENCOUNTER — APPOINTMENT (OUTPATIENT)
Dept: ORTHOPEDIC SURGERY | Facility: CLINIC | Age: 38
End: 2023-06-12

## 2023-06-14 ENCOUNTER — APPOINTMENT (OUTPATIENT)
Dept: NEUROPSYCHOLOGY | Facility: CLINIC | Age: 38
End: 2023-06-14
Payer: OTHER MISCELLANEOUS

## 2023-06-14 PROCEDURE — 90791 PSYCH DIAGNOSTIC EVALUATION: CPT
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
## 2023-07-11 ENCOUNTER — FORM ENCOUNTER (OUTPATIENT)
Age: 38
End: 2023-07-11

## 2023-07-17 ENCOUNTER — APPOINTMENT (OUTPATIENT)
Dept: ORTHOPEDIC SURGERY | Facility: CLINIC | Age: 38
End: 2023-07-17
Payer: OTHER MISCELLANEOUS

## 2023-07-17 ENCOUNTER — NON-APPOINTMENT (OUTPATIENT)
Age: 38
End: 2023-07-17

## 2023-07-17 VITALS — HEIGHT: 62 IN | BODY MASS INDEX: 27.97 KG/M2 | WEIGHT: 152 LBS

## 2023-07-17 PROCEDURE — 99024 POSTOP FOLLOW-UP VISIT: CPT

## 2023-07-17 NOTE — PHYSICAL EXAM
[de-identified] : Patient is a well-healed surgical skin incision no redness or drainage there is stiffness of the fingers there is a limited fist.  She has limited function in the right hand.  There is vasomotor disturbance as well.  The hand has a bluish hue to it.
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
## 2023-07-19 ENCOUNTER — APPOINTMENT (OUTPATIENT)
Dept: PAIN MANAGEMENT | Facility: CLINIC | Age: 38
End: 2023-07-19
Payer: OTHER MISCELLANEOUS

## 2023-07-19 DIAGNOSIS — T23.079A: ICD-10-CM

## 2023-07-19 PROCEDURE — 99213 OFFICE O/P EST LOW 20 MIN: CPT | Mod: ACP

## 2023-07-19 NOTE — PHYSICAL EXAM
[de-identified] : Right wrist – carpal tunnel scar well healed. Mild welling of the hand noted. Wrist ROM slight decreased. Mild hypersensitivity noted over the scar.
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
## 2023-07-21 ENCOUNTER — APPOINTMENT (OUTPATIENT)
Dept: NEUROPSYCHOLOGY | Facility: CLINIC | Age: 38
End: 2023-07-21
Payer: OTHER MISCELLANEOUS

## 2023-07-21 DIAGNOSIS — F43.23 ADJUSTMENT DISORDER WITH MIXED ANXIETY AND DEPRESSED MOOD: ICD-10-CM

## 2023-07-21 PROCEDURE — 96132 NRPSYC TST EVAL PHYS/QHP 1ST: CPT

## 2023-07-21 PROCEDURE — 96133 NRPSYC TST EVAL PHYS/QHP EA: CPT

## 2023-07-21 NOTE — ASSESSMENT
[FreeTextEntry1] : needs OT\par needs mental health therapy for PTSD\par \par needs pain mangament \par \par follow 6 weeks

## 2023-07-21 NOTE — PHYSICAL EXAM
[de-identified] : healed incision\par weak grasp\par limiited flexion\par \par tips do not touch the palm\par
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
## 2023-07-21 NOTE — PHYSICAL EXAM
[de-identified] : healed incision\par weak grasp\par limiited flexion\par \par tips do not touch the palm\par
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
## 2023-08-02 ENCOUNTER — APPOINTMENT (OUTPATIENT)
Dept: NEUROPSYCHOLOGY | Facility: CLINIC | Age: 38
End: 2023-08-02
Payer: OTHER MISCELLANEOUS

## 2023-08-02 PROCEDURE — 90834 PSYTX W PT 45 MINUTES: CPT

## 2023-08-09 ENCOUNTER — APPOINTMENT (OUTPATIENT)
Dept: NEUROPSYCHOLOGY | Facility: CLINIC | Age: 38
End: 2023-08-09
Payer: OTHER MISCELLANEOUS

## 2023-08-09 PROCEDURE — 90834 PSYTX W PT 45 MINUTES: CPT

## 2023-08-16 ENCOUNTER — APPOINTMENT (OUTPATIENT)
Dept: NEUROPSYCHOLOGY | Facility: CLINIC | Age: 38
End: 2023-08-16
Payer: OTHER MISCELLANEOUS

## 2023-08-16 PROCEDURE — 90834 PSYTX W PT 45 MINUTES: CPT

## 2023-08-23 ENCOUNTER — APPOINTMENT (OUTPATIENT)
Dept: NEUROPSYCHOLOGY | Facility: CLINIC | Age: 38
End: 2023-08-23
Payer: OTHER MISCELLANEOUS

## 2023-08-23 PROCEDURE — 90834 PSYTX W PT 45 MINUTES: CPT

## 2023-08-24 ENCOUNTER — RX RENEWAL (OUTPATIENT)
Age: 38
End: 2023-08-24

## 2023-08-28 ENCOUNTER — APPOINTMENT (OUTPATIENT)
Dept: ORTHOPEDIC SURGERY | Facility: CLINIC | Age: 38
End: 2023-08-28
Payer: OTHER MISCELLANEOUS

## 2023-08-28 PROCEDURE — 99213 OFFICE O/P EST LOW 20 MIN: CPT

## 2023-08-28 NOTE — DATA REVIEWED
[FreeTextEntry1] : I reviewed her EMG test from previous that did document a right-sided carpal tunnel syndrome but also showed carpal tunnel on the left side as well.

## 2023-08-28 NOTE — PHYSICAL EXAM
[de-identified] : Patient is well-healed surgical skin incision patient has much better finger extension and finger flexion still poor grasp and still some stiffness but much less incisional tenderness and much less pain throughout the hand.  Patient has normal sensation on the left hand.  Has good thenar strength without thenar atrophy.  Patient has mild Tinel's and median nerve compression test on the left hand.

## 2023-08-28 NOTE — HISTORY OF PRESENT ILLNESS
[de-identified] : 38-year-old female who had a right-sided carpal tunnel release surgery done.  She actually is improved in her range of motion since her last visit and she is functioning better.  She has less pain and discomfort that she did before.  She has improvement in her her right hand but she still cannot do her normal work activities.  Is totally disabled.  Patient has no complaints of numbness and tingling on the left hand.
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
## 2023-08-30 ENCOUNTER — APPOINTMENT (OUTPATIENT)
Dept: NEUROPSYCHOLOGY | Facility: CLINIC | Age: 38
End: 2023-08-30

## 2023-09-06 ENCOUNTER — APPOINTMENT (OUTPATIENT)
Dept: NEUROPSYCHOLOGY | Facility: CLINIC | Age: 38
End: 2023-09-06
Payer: OTHER MISCELLANEOUS

## 2023-09-06 PROCEDURE — 90834 PSYTX W PT 45 MINUTES: CPT

## 2023-09-13 ENCOUNTER — APPOINTMENT (OUTPATIENT)
Dept: NEUROPSYCHOLOGY | Facility: CLINIC | Age: 38
End: 2023-09-13
Payer: OTHER MISCELLANEOUS

## 2023-09-13 PROCEDURE — 90834 PSYTX W PT 45 MINUTES: CPT

## 2023-09-20 ENCOUNTER — APPOINTMENT (OUTPATIENT)
Dept: NEUROPSYCHOLOGY | Facility: CLINIC | Age: 38
End: 2023-09-20
Payer: OTHER MISCELLANEOUS

## 2023-09-20 PROCEDURE — 90834 PSYTX W PT 45 MINUTES: CPT

## 2023-10-08 ENCOUNTER — NON-APPOINTMENT (OUTPATIENT)
Age: 38
End: 2023-10-08

## 2023-10-09 ENCOUNTER — APPOINTMENT (OUTPATIENT)
Dept: ORTHOPEDIC SURGERY | Facility: CLINIC | Age: 38
End: 2023-10-09
Payer: OTHER MISCELLANEOUS

## 2023-10-09 VITALS — WEIGHT: 152 LBS | BODY MASS INDEX: 27.97 KG/M2 | HEIGHT: 62 IN

## 2023-10-09 PROCEDURE — 99213 OFFICE O/P EST LOW 20 MIN: CPT

## 2023-10-11 ENCOUNTER — APPOINTMENT (OUTPATIENT)
Dept: NEUROPSYCHOLOGY | Facility: CLINIC | Age: 38
End: 2023-10-11
Payer: OTHER MISCELLANEOUS

## 2023-10-11 PROCEDURE — 90834 PSYTX W PT 45 MINUTES: CPT

## 2023-10-18 ENCOUNTER — APPOINTMENT (OUTPATIENT)
Dept: NEUROPSYCHOLOGY | Facility: CLINIC | Age: 38
End: 2023-10-18
Payer: OTHER MISCELLANEOUS

## 2023-10-18 PROCEDURE — 90834 PSYTX W PT 45 MINUTES: CPT

## 2023-10-24 ENCOUNTER — APPOINTMENT (OUTPATIENT)
Dept: PAIN MANAGEMENT | Facility: CLINIC | Age: 38
End: 2023-10-24
Payer: OTHER MISCELLANEOUS

## 2023-10-24 VITALS
WEIGHT: 152 LBS | BODY MASS INDEX: 27.97 KG/M2 | HEIGHT: 62 IN | SYSTOLIC BLOOD PRESSURE: 145 MMHG | DIASTOLIC BLOOD PRESSURE: 95 MMHG | HEART RATE: 105 BPM

## 2023-10-24 PROCEDURE — 99215 OFFICE O/P EST HI 40 MIN: CPT

## 2023-10-25 ENCOUNTER — APPOINTMENT (OUTPATIENT)
Dept: NEUROPSYCHOLOGY | Facility: CLINIC | Age: 38
End: 2023-10-25
Payer: OTHER MISCELLANEOUS

## 2023-10-25 PROCEDURE — 90834 PSYTX W PT 45 MINUTES: CPT

## 2023-11-01 ENCOUNTER — APPOINTMENT (OUTPATIENT)
Dept: NEUROPSYCHOLOGY | Facility: CLINIC | Age: 38
End: 2023-11-01
Payer: OTHER MISCELLANEOUS

## 2023-11-01 PROCEDURE — 90834 PSYTX W PT 45 MINUTES: CPT

## 2023-11-08 ENCOUNTER — APPOINTMENT (OUTPATIENT)
Dept: NEUROPSYCHOLOGY | Facility: CLINIC | Age: 38
End: 2023-11-08
Payer: OTHER MISCELLANEOUS

## 2023-11-08 PROCEDURE — 90834 PSYTX W PT 45 MINUTES: CPT

## 2023-11-15 ENCOUNTER — APPOINTMENT (OUTPATIENT)
Dept: NEUROPSYCHOLOGY | Facility: CLINIC | Age: 38
End: 2023-11-15
Payer: OTHER MISCELLANEOUS

## 2023-11-15 PROCEDURE — 90834 PSYTX W PT 45 MINUTES: CPT

## 2023-11-19 ENCOUNTER — NON-APPOINTMENT (OUTPATIENT)
Age: 38
End: 2023-11-19

## 2023-11-20 ENCOUNTER — APPOINTMENT (OUTPATIENT)
Dept: ORTHOPEDIC SURGERY | Facility: CLINIC | Age: 38
End: 2023-11-20
Payer: OTHER MISCELLANEOUS

## 2023-11-20 PROCEDURE — 99213 OFFICE O/P EST LOW 20 MIN: CPT

## 2023-11-22 ENCOUNTER — APPOINTMENT (OUTPATIENT)
Dept: NEUROPSYCHOLOGY | Facility: CLINIC | Age: 38
End: 2023-11-22

## 2023-11-22 NOTE — ASU PATIENT PROFILE, ADULT - TEACHING/LEARNING RELIGIOUS CONSIDERATIONS
Take meds as prescribed. Follow up with doctor  in 3 -4 days.   Return to ER immediately if symptoms worsen or persist. none

## 2023-11-29 ENCOUNTER — APPOINTMENT (OUTPATIENT)
Dept: NEUROPSYCHOLOGY | Facility: CLINIC | Age: 38
End: 2023-11-29

## 2023-12-06 ENCOUNTER — APPOINTMENT (OUTPATIENT)
Dept: NEUROPSYCHOLOGY | Facility: CLINIC | Age: 38
End: 2023-12-06
Payer: OTHER MISCELLANEOUS

## 2023-12-06 PROCEDURE — 90834 PSYTX W PT 45 MINUTES: CPT

## 2023-12-13 ENCOUNTER — APPOINTMENT (OUTPATIENT)
Dept: NEUROPSYCHOLOGY | Facility: CLINIC | Age: 38
End: 2023-12-13
Payer: OTHER MISCELLANEOUS

## 2023-12-13 PROCEDURE — 90834 PSYTX W PT 45 MINUTES: CPT

## 2023-12-20 ENCOUNTER — APPOINTMENT (OUTPATIENT)
Dept: NEUROPSYCHOLOGY | Facility: CLINIC | Age: 38
End: 2023-12-20
Payer: OTHER MISCELLANEOUS

## 2023-12-20 PROCEDURE — 90834 PSYTX W PT 45 MINUTES: CPT

## 2023-12-27 ENCOUNTER — APPOINTMENT (OUTPATIENT)
Dept: NEUROPSYCHOLOGY | Facility: CLINIC | Age: 38
End: 2023-12-27

## 2024-01-03 ENCOUNTER — APPOINTMENT (OUTPATIENT)
Dept: NEUROPSYCHOLOGY | Facility: CLINIC | Age: 39
End: 2024-01-03

## 2024-01-08 ENCOUNTER — APPOINTMENT (OUTPATIENT)
Dept: ORTHOPEDIC SURGERY | Facility: CLINIC | Age: 39
End: 2024-01-08
Payer: OTHER MISCELLANEOUS

## 2024-01-08 PROCEDURE — 99213 OFFICE O/P EST LOW 20 MIN: CPT
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
## 2024-01-08 NOTE — PHYSICAL EXAM
[de-identified] : Patient has good passive range of motion but still hesitant to do range of motion to the hand.  Has diminished sensation at the tips of the fingers.  She has normal cap refill.  Mild swelling present.

## 2024-01-08 NOTE — HISTORY OF PRESENT ILLNESS
[de-identified] : 38-year-old female still with continued pain discomfort in her right hand.  She did see her neurologist he wants her evaluated by pain management for to put possibility of reflex sympathetic dystrophy already seen pain management in the past medications were given

## 2024-01-10 ENCOUNTER — APPOINTMENT (OUTPATIENT)
Dept: NEUROPSYCHOLOGY | Facility: CLINIC | Age: 39
End: 2024-01-10
Payer: OTHER MISCELLANEOUS

## 2024-01-10 PROCEDURE — 90834 PSYTX W PT 45 MINUTES: CPT

## 2024-01-17 ENCOUNTER — APPOINTMENT (OUTPATIENT)
Dept: NEUROPSYCHOLOGY | Facility: CLINIC | Age: 39
End: 2024-01-17

## 2024-01-24 ENCOUNTER — APPOINTMENT (OUTPATIENT)
Dept: NEUROPSYCHOLOGY | Facility: CLINIC | Age: 39
End: 2024-01-24
Payer: OTHER MISCELLANEOUS

## 2024-01-24 ENCOUNTER — APPOINTMENT (OUTPATIENT)
Dept: PAIN MANAGEMENT | Facility: CLINIC | Age: 39
End: 2024-01-24
Payer: OTHER MISCELLANEOUS

## 2024-01-24 VITALS
DIASTOLIC BLOOD PRESSURE: 88 MMHG | BODY MASS INDEX: 27.97 KG/M2 | WEIGHT: 152 LBS | SYSTOLIC BLOOD PRESSURE: 138 MMHG | HEIGHT: 62 IN

## 2024-01-24 DIAGNOSIS — T23.171A: ICD-10-CM

## 2024-01-24 DIAGNOSIS — Z78.9 OTHER SPECIFIED HEALTH STATUS: ICD-10-CM

## 2024-01-24 DIAGNOSIS — T23.071D: ICD-10-CM

## 2024-01-24 PROCEDURE — 90834 PSYTX W PT 45 MINUTES: CPT

## 2024-01-24 PROCEDURE — 99213 OFFICE O/P EST LOW 20 MIN: CPT | Mod: ACP

## 2024-01-24 RX ORDER — OXYCODONE AND ACETAMINOPHEN 5; 325 MG/1; MG/1
5-325 TABLET ORAL
Qty: 20 | Refills: 0 | Status: DISCONTINUED | COMMUNITY
Start: 2023-05-11 | End: 2024-01-24

## 2024-01-24 RX ORDER — NORTRIPTYLINE HYDROCHLORIDE 25 MG/1
25 CAPSULE ORAL
Qty: 60 | Refills: 1 | Status: DISCONTINUED | COMMUNITY
Start: 2023-06-06 | End: 2024-01-24

## 2024-01-24 RX ORDER — TRAZODONE HYDROCHLORIDE 150 MG/1
150 TABLET ORAL
Qty: 30 | Refills: 3 | Status: DISCONTINUED | COMMUNITY
Start: 2023-10-24 | End: 2024-01-24

## 2024-01-24 NOTE — PHYSICAL EXAM
[de-identified] : Right wrist - carpal tunnel scar well healed. Mild welling of the hand noted. Wrist ROM slight decreased. Mild hypersensitivity noted over the scar.
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
## 2024-01-24 NOTE — DISCUSSION/SUMMARY
[de-identified] : 38-year-old female presenting with ongoing right wrist pain consistent with RSD. She continues with occupational therapy and the use of gabapentin 600 mg 3 times daily.  We discussed trialing a right wrist steroid injection for relief. She would like to discuss injection therapy with an attending physician.She was also advised to continue with conservative treatment.  She will follow-up in 3 months for reassessment.  Disability status: Temporary total disability. Patient is unable to return to work at this time.   OSCAR Ventura D.O

## 2024-01-30 ENCOUNTER — APPOINTMENT (OUTPATIENT)
Dept: PAIN MANAGEMENT | Facility: CLINIC | Age: 39
End: 2024-01-30
Payer: OTHER MISCELLANEOUS

## 2024-01-30 DIAGNOSIS — T23.079A: ICD-10-CM

## 2024-01-30 PROCEDURE — 99214 OFFICE O/P EST MOD 30 MIN: CPT

## 2024-01-30 RX ORDER — METHYLPREDNISOLONE 4 MG/1
4 TABLET ORAL
Qty: 1 | Refills: 0 | Status: ACTIVE | COMMUNITY
Start: 2024-01-30 | End: 1900-01-01

## 2024-01-30 NOTE — PHYSICAL EXAM
[de-identified] : Right wrist - carpal tunnel scar well healed. Mild welling of the hand noted. Wrist ROM slight decreased. Mild hypersensitivity noted over the scar.
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
## 2024-01-30 NOTE — DISCUSSION/SUMMARY
[de-identified] : A discussion regarding available pain management treatment options occurred with the patient. These included interventional, rehabilitative, pharmacological, and alternative modalities. We will proceed with the following:   Interventional treatment options: 1. Discussed SCS trial.    2. MRI of the right wrist ordered to rule out any internal derangement.   Rehabilitative options: - continue with occupational therapy.    Medication based treatment options: -Ordered a Medrol Dose Pack 4mg, use as directed for a duration of 6 days. - c/w gabapentin 600mg TID.   Disability status: Temporary total disability. Patient is unable to return to work at this time.   Follow up after imaging studies for further recommendations.  I, Vj Bryant, attest that this documentation has been prepared under the direction and in the presence of Provider Chuck Jones, DO The documentation recorded by the scribe, in my presence, accurately reflects the service I personally performed, and the decisions made by me with my edits as appropriate.  Best Regards, Chuck Jones D.O.

## 2024-01-31 ENCOUNTER — APPOINTMENT (OUTPATIENT)
Dept: NEUROPSYCHOLOGY | Facility: CLINIC | Age: 39
End: 2024-01-31

## 2024-02-07 ENCOUNTER — APPOINTMENT (OUTPATIENT)
Dept: NEUROPSYCHOLOGY | Facility: CLINIC | Age: 39
End: 2024-02-07
Payer: OTHER MISCELLANEOUS

## 2024-02-07 PROCEDURE — 90834 PSYTX W PT 45 MINUTES: CPT

## 2024-02-07 NOTE — ADDENDUM
[FreeTextEntry1] : Diagnosis:   F43.23  Adjustment Disorder with Mixed Anxiety and Depressed Mood   F43.10 Post traumatic stress disorder    LT.  The client will learn strategies to cope with losses and limitations  2.  The client will verbalize positive constructive statements about self and the future  3.  The client will identify and verbalize feelings regarding her medical conditions   ST.  The client will identify and accept needed changes with her environment  2.  The client will learn problem solving strategies to cope with changes  3.  The client will set realistic personal goals that build on the client's positive characteristics   The client was on time for her session.  The client shared that she just started PT again and she was given steroids to help decrease the swelling.  She is scheduled to have an MRI for her new doctor.   The client is following all recommendations from her doctor and the physical therapist.  The client is learning to accept her disability.  The client shared about changes in her lifestyle which are hard.  We discussed coping mechanism, and she is engaging in yoga.  The client continues to need supportive therapy.  The client will continue with therapy to address the above treatment goals.  The worker provides active listening and CPT skills and suggestions. Goals addressed in this session: LT, 2, 3  STG:   ST, 2, 3

## 2024-02-14 ENCOUNTER — APPOINTMENT (OUTPATIENT)
Dept: NEUROPSYCHOLOGY | Facility: CLINIC | Age: 39
End: 2024-02-14
Payer: OTHER MISCELLANEOUS

## 2024-02-14 PROCEDURE — 90834 PSYTX W PT 45 MINUTES: CPT

## 2024-02-14 NOTE — ADDENDUM
[FreeTextEntry1] : Diagnosis:   F43.23  Adjustment Disorder with Mixed Anxiety and Depressed Mood   F43.10 Post traumatic stress disorder    LT.  The client will learn strategies to cope with losses and limitations  2.  The client will verbalize positive constructive statements about self and the future  3.  The client will identify and verbalize feelings regarding her medical conditions   ST.  The client will identify and accept needed changes with her environment  2.  The client will learn problem solving strategies to cope with changes  3.  The client will set realistic personal goals that build on the client's positive characteristics.   The client was on time for her individual session.  The client shared regarding her health issues.  She had an MRI which was very painful. She is applying for disability and is not happy about not being able to work.  The client is very sad and needs to continue with therapy.  The client is experiencing pain and discomfort on a daily basis.  The client will continue with therapy to address the above treatment goals.  The worker provides active listening and exploring CBT skills.  Goals addressed in this session: LT, 2, 3  STG:   ST, 2, 3

## 2024-02-20 ENCOUNTER — NON-APPOINTMENT (OUTPATIENT)
Age: 39
End: 2024-02-20

## 2024-02-20 ENCOUNTER — APPOINTMENT (OUTPATIENT)
Dept: ORTHOPEDIC SURGERY | Facility: CLINIC | Age: 39
End: 2024-02-20
Payer: OTHER MISCELLANEOUS

## 2024-02-20 VITALS — HEIGHT: 62 IN | BODY MASS INDEX: 27.97 KG/M2 | WEIGHT: 152 LBS

## 2024-02-20 PROCEDURE — 99213 OFFICE O/P EST LOW 20 MIN: CPT
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
## 2024-02-20 NOTE — ASSESSMENT
[FreeTextEntry1] : Patient has carpal tunnel syndrome that had surgery.  It does appear as though she has complex regional pain syndrome.  There is been discussion of pain management procedures for this.  She has not had a stellate ganglion block.  She continues with therapy.  Was also seeing a psychologist.  She will see me back in 6 weeks.  I did review her MRI which showed no significant structural abnormalities that would require any surgical intervention.

## 2024-02-20 NOTE — HISTORY OF PRESENT ILLNESS
[de-identified] : 38-year-old female with right wrist injury.  Ended up having carpal tunnel surgery release done.  She still complaining about pain discomfort.  She comes in today for evaluation.  She is not working.  She is totally disabled.  She is under the care of pain management as well.

## 2024-02-21 ENCOUNTER — APPOINTMENT (OUTPATIENT)
Dept: NEUROPSYCHOLOGY | Facility: CLINIC | Age: 39
End: 2024-02-21
Payer: OTHER MISCELLANEOUS

## 2024-02-21 PROCEDURE — 90834 PSYTX W PT 45 MINUTES: CPT

## 2024-02-21 NOTE — ADDENDUM
[FreeTextEntry1] : Diagnosis: F43.23 Adjustment Disorder with Mixed Anxiety and Depressed Mood F43.10 Post traumatic stress disorder  LT. The client will learn strategies to cope with losses and limitations 2. The client will verbalize positive constructive statements about self and the future 3. The client will identify and verbalize feelings regarding her medical conditions  ST. The client will identify and accept needed changes with her environment 2. The client will learn problem solving strategies to cope with changes 3. The client will set realistic personal goals that build on the client's positive characteristics.  The client was on time for her individual session. The client received the results of her MRI, and it is bad.  She thinks they might try to recommend the Spinal Cord Stimulator. The client does not want to take medication and is not sure about what she will do moving forward regarding her medical issues.  She believes that doing the surgery on her hand surfaced the carpal tunnel syndrome. She reports feeling sad for the loss of her career. and the continued physical pain. The client will continue with therapy to address the above treatment goals. The worker provides active listening and explores CBT skills. Goals addressed in this session: LT, 2, 3 STG: ST, 2, 3

## 2024-02-22 ENCOUNTER — APPOINTMENT (OUTPATIENT)
Dept: PAIN MANAGEMENT | Facility: CLINIC | Age: 39
End: 2024-02-22
Payer: OTHER MISCELLANEOUS

## 2024-02-22 PROCEDURE — 99214 OFFICE O/P EST MOD 30 MIN: CPT | Mod: ACP

## 2024-02-22 NOTE — DISCUSSION/SUMMARY
[de-identified] : 38 y.old with persistent and severe right wrist pain which is not responding to conservative treatment, painfully decreased Right wrist ROM. A discussion regarding available pain management treatment options occurred with the patient. These included interventional, rehabilitative, pharmacological, and alternative modalities. We will proceed with the following:   Interventional treatment options: 1. We will submit for Right wrist steroid injection in order to decrease inflammation and pain and to improve ROM. JOINT INJECTION PARAGRAPH Wrist: Patient has decrease range of motion and pain in the Right wrist joint. We will schedule a Right wrist joint injection with steroid.  Risk, benefits, pros and cons of procedure were explained to the patient using models and diagrams and their questions were answered.  Rehabilitative options: - continue with occupational therapy.    Medication based treatment options: -d/c Ibuprofen and start Diclofenac Sodium 75mg BID - c/w Gabapentin 600mg TID.   Disability status: Temporary total disability. Patient is unable to return to work at this time.   Total clinician time spent today on the patient is 30 minutes including preparing to see the patient, obtaining and/or reviewing and confirming history, performing medically necessary and appropriate examination, counseling and educating the patient and/or family, documenting clinical information in the EHR and communicating and/or referring to other healthcare professionals.  OSCAR Roman D.O.     [Medication Risks Reviewed] : Medication risks reviewed
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
## 2024-02-22 NOTE — DATA REVIEWED
[FreeTextEntry1] : SOAPP: Scored a 0 , low risk.   [place] REGISTRY: Reviewed .     UDS: No data obtained today.     Medications that trigger a UDS: Benzodiazepines (Ativan, Xanax, Valium) etc, Barbiturates, Narcotics (Avinza, Butrans, hydrocodone, Codeine, Arlene, Methadone, Morphine, MS Contin, Opana, oxycodone, Oxycontin, Suboxone etc), Pregabalin (Lyrica), Tramadol (Ultacet, Utram etc), Tapentadol, (Nucynta) and Elist Drugs (cocaine, THC, Etc.)   Risk factors: Bipolar Illness, positive for any an illicit drugs, history of any ETOH and drug abuse, any signs of diversion, Sharing Meds, selling meds. Non consistent [place] drug reporting and above 120meq of morphine   Low risk: Patient has combination of a low risk SOAP and no risk factors. UDS would be repeated randomly every quarter   MRI R wrist 02/12/2024: moderate to severe tendinosis/tendinopathy, arthritic changes with some synovial fluid accumulation.

## 2024-02-22 NOTE — PHYSICAL EXAM
[de-identified] : Right wrist - carpal tunnel scar well healed. Mild welling of the hand noted. Wrist ROM slight decreased. Mild hypersensitivity noted over the scar.

## 2024-02-26 ENCOUNTER — APPOINTMENT (OUTPATIENT)
Dept: NEUROPSYCHOLOGY | Facility: CLINIC | Age: 39
End: 2024-02-26
Payer: OTHER MISCELLANEOUS

## 2024-02-26 PROCEDURE — 90834 PSYTX W PT 45 MINUTES: CPT

## 2024-02-26 NOTE — ADDENDUM
[FreeTextEntry1] : Diagnosis: F43.23 Adjustment Disorder with Mixed Anxiety and Depressed Mood F43.10 Post traumatic stress disorder  LT. The client will learn strategies to cope with losses and limitations 2. The client will verbalize positive constructive statements about self and the future 3. The client will identify and verbalize feelings regarding her medical conditions  ST. The client will identify and accept needed changes with her environment 2. The client will learn problem solving strategies to cope with changes 3. The client will set realistic personal goals that build on the client's positive characteristics.  The client was on time for her individual session.  The client shared several issues, medical and family.  She was cleared to have an injection with pain management.  She is using a brace for her hand which is helping relieve the pain.  The client will continue with therapy to address the above treatment goals. The worker provides active listening and help to identify negative emotions and thoughts. Goals addressed in this session: LT, 2, 3 STG: ST, 2, 3

## 2024-02-27 ENCOUNTER — APPOINTMENT (OUTPATIENT)
Dept: NEUROLOGY | Facility: CLINIC | Age: 39
End: 2024-02-27

## 2024-02-29 ENCOUNTER — APPOINTMENT (OUTPATIENT)
Dept: PAIN MANAGEMENT | Facility: CLINIC | Age: 39
End: 2024-02-29
Payer: OTHER MISCELLANEOUS

## 2024-02-29 PROCEDURE — 20526 THER INJECTION CARP TUNNEL: CPT

## 2024-02-29 NOTE — DISCUSSION/SUMMARY
[Medication Risks Reviewed] : Medication risks reviewed [de-identified] : 38 y.old with persistent and severe right wrist pain which is not responding to conservative treatment, painfully decreased Right wrist ROM. We will proceed with Right Carpal Tunnel injection today.  JOINT INJECTION PARAGRAPH Wrist: Patient has decrease range of motion and pain in the Right wrist joint. We will schedule a Right wrist joint injection with steroid.  Risk, benefits, pros and cons of procedure were explained to the patient using models and diagrams and their questions were answered.  Total clinician time spent today on the patient is 15 minutes including preparing to see the patient, obtaining and/or reviewing and confirming history, performing medically necessary and appropriate examination, counseling and educating the patient and/or family, documenting clinical information in the EHR and communicating and/or referring to other healthcare professionals.  Mu Corcoran PA-C
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
## 2024-02-29 NOTE — PROCEDURE
[Carpal Tunnel] : carpal tunnel [Right] : of the right [Pain] : pain [Inflammation] : inflammation [Alcohol] : alcohol [Ethyl Chloride sprayed topically] : ethyl chloride sprayed topically [___ cc    1%] : Lidocaine ~Vcc of 1%  [___ cc    40mg] : Methylprednisolone (Depomedrol) ~Vcc of 40 mg  [] : Patient tolerated procedure well [Call if redness, pain or fever occur] : call if redness, pain or fever occur [Apply ice for 15min out of every hour for the next 12-24 hours as tolerated] : apply ice for 15 minutes out of every hour for the next 12-24 hours as tolerated [Previous OTC use and PT nontherapeutic] : patient has tried OTC's including aspirin, Ibuprofen, Aleve, etc or prescription NSAIDS, and/or exercises at home and/or physical therapy without satisfactory response [Risks, benefits, alternatives discussed / Verbal consent obtained] : the risks benefits, and alternatives have been discussed, and verbal consent was obtained

## 2024-02-29 NOTE — PHYSICAL EXAM
[de-identified] : Right wrist - carpal tunnel scar well healed. Mild welling of the hand noted. Wrist ROM slight decreased. Mild hypersensitivity noted over the scar.

## 2024-03-04 ENCOUNTER — APPOINTMENT (OUTPATIENT)
Dept: NEUROPSYCHOLOGY | Facility: CLINIC | Age: 39
End: 2024-03-04
Payer: OTHER MISCELLANEOUS

## 2024-03-04 PROCEDURE — 90834 PSYTX W PT 45 MINUTES: CPT

## 2024-03-04 NOTE — ADDENDUM
[FreeTextEntry1] : Diagnosis: F43.23 Adjustment Disorder with Mixed Anxiety and Depressed Mood F43.10 Post traumatic stress disorder  LT. The client will learn strategies to cope with losses and limitations 2. The client will verbalize positive constructive statements about self and the future 3. The client will identify and verbalize feelings regarding her medical conditions  ST. The client will identify and accept needed changes with her environment 2. The client will learn problem solving strategies to cope with changes 3. The client will set realistic personal goals that build on the client's positive characteristics.  The client was on time and shared her frustrations with the system and how she feels cheated. She gave so much to her job, and they tossed her away.  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  She is right-handed and continues to have physical pain.  She attends all of her appointments and wants to get better, but it isn't getting better.  She wants to work but doesn't know what she can do. The client will continue with therapy to address the above treatment goals. The worker provides active listening and help to identify negative emotions and thoughts. Goals addressed in this session: LT, 2, 3 STG: ST, 2,

## 2024-03-11 ENCOUNTER — APPOINTMENT (OUTPATIENT)
Dept: NEUROPSYCHOLOGY | Facility: CLINIC | Age: 39
End: 2024-03-11
Payer: OTHER MISCELLANEOUS

## 2024-03-11 PROCEDURE — 90834 PSYTX W PT 45 MINUTES: CPT

## 2024-03-11 NOTE — BEHAVIORAL HEALTH
[Prevent psychological harm to patient or another individual] : Prevent psychological harm to patient or another individual [FreeTextEntry2] :     Diagnosis: F43.23 Adjustment Disorder with Mixed Anxiety and Depressed Mood F43.10 Post traumatic stress disorder  LT. The client will learn strategies to cope with losses and limitations 2. The client will verbalize positive constructive statements about self and the future 3. The client will identify and verbalize feelings regarding her medical conditions  ST. The client will identify and accept needed changes with her environment 2. The client will learn problem solving strategies to cope with changes 3. The client will set realistic personal goals that build on the client's positive characteristics.  The client was on time and upset with workman's comp. She has a different  representing her and asked her to settle at seventy-five percent disabled not one hundred which all three doctors are saying. She continues to be in physical pain.  She reports that the shot helped but she still can't use her right hand appropriately.  The client will continue with therapy to address the above treatment goals. The worker provides active listening and help to identify negative emotions and thoughts. Goals addressed in this session: LT, 2, 3 STG: ST, 2,

## 2024-03-18 ENCOUNTER — APPOINTMENT (OUTPATIENT)
Dept: NEUROPSYCHOLOGY | Facility: CLINIC | Age: 39
End: 2024-03-18
Payer: OTHER MISCELLANEOUS

## 2024-03-18 PROCEDURE — 90834 PSYTX W PT 45 MINUTES: CPT

## 2024-03-18 NOTE — BEHAVIORAL HEALTH
[Prevent psychological harm to patient or another individual] : Prevent psychological harm to patient or another individual [FreeTextEntry2] : Diagnosis: F43.23 Adjustment Disorder with Mixed Anxiety and Depressed Mood F43.10 Post traumatic stress disorder  LT. The client will learn strategies to cope with losses and limitations 2. The client will verbalize positive constructive statements about self and the future 3. The client will identify and verbalize feelings regarding her medical conditions  ST. The client will identify and accept needed changes with her environment 2. The client will learn problem solving strategies to cope with changes 3. The client will set realistic personal goals that build on the client's positive characteristics.  The client had difficulty with parking which caused her to be late.  The client continues to be in physical pain from her hand. She is frustrated with her situation and the loss of her ability to use her right hand.  The client will continue with therapy to address the above treatment goals. The worker provides active listening and help to identify negative emotions and thoughts. Goals addressed in this session: LT, 2, 3 STG: ST, 2,

## 2024-03-25 ENCOUNTER — APPOINTMENT (OUTPATIENT)
Dept: NEUROPSYCHOLOGY | Facility: CLINIC | Age: 39
End: 2024-03-25

## 2024-04-01 ENCOUNTER — APPOINTMENT (OUTPATIENT)
Dept: NEUROPSYCHOLOGY | Facility: CLINIC | Age: 39
End: 2024-04-01
Payer: OTHER MISCELLANEOUS

## 2024-04-01 PROCEDURE — 90834 PSYTX W PT 45 MINUTES: CPT

## 2024-04-01 NOTE — BEHAVIORAL HEALTH
[Prevent psychological harm to patient or another individual] : Prevent psychological harm to patient or another individual [FreeTextEntry2] : Diagnosis: F43.23 Adjustment Disorder with Mixed Anxiety and Depressed Mood F43.10 Post traumatic stress disorder  LT. The client will learn strategies to cope with losses and limitations 2. The client will verbalize positive constructive statements about self and the future 3. The client will identify and verbalize feelings regarding her medical conditions  ST. The client will identify and accept needed changes with her environment 2. The client will learn problem solving strategies to cope with changes 3. The client will set realistic personal goals that build on the client's positive characteristics.  The client was on time today.  She continues to have difficulty with her hand and is frustrated with her situation.  She admits that she needs to have medication to deal with her anxiety. The client will continue with therapy to address the above treatment goals. The worker provides active listening and help to identify negative emotions and thoughts. Goals addressed in this session: LT, 2, 3 STG: ST, 2,

## 2024-04-02 ENCOUNTER — NON-APPOINTMENT (OUTPATIENT)
Age: 39
End: 2024-04-02

## 2024-04-02 ENCOUNTER — APPOINTMENT (OUTPATIENT)
Dept: ORTHOPEDIC SURGERY | Facility: CLINIC | Age: 39
End: 2024-04-02
Payer: OTHER MISCELLANEOUS

## 2024-04-02 PROCEDURE — 99213 OFFICE O/P EST LOW 20 MIN: CPT | Mod: 25

## 2024-04-02 PROCEDURE — 20550 NJX 1 TENDON SHEATH/LIGAMENT: CPT | Mod: RT

## 2024-04-02 NOTE — PHYSICAL EXAM
[de-identified] : On examination today the patient has much tenderness along the flexor carpi radialis.  Nontender along the incision more along the flexor carpi radialis with resisted wrist flexion she has pain discomfort along the flexor carpi radialis

## 2024-04-02 NOTE — ASSESSMENT
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  I gave her cortisone injection at that level today.  I rated that injection well.  Seems as though there was some reasonable pain relief with that.  She will see me back in 6 weeks.  She will continue with her therapy program.

## 2024-04-02 NOTE — PROCEDURE
[FreeTextEntry3] : FC R injection was performed because of pain inflammation and stiffness Anesthesia: ethyl chloride sprayed topically Dexamethasone injection of 1cc 4mg/ml Lidocaine: An injection of Lidocaine 1% 1cc  Patient has tried OTC's including aspirin, Ibuprofen, Aleve etc or prescription NSAIDS, and/or exercises at home and/ or physical therapy without satisfactory response. After verbal consent using sterile preparation and technique. The risks, benefits, and alternatives to cortisone injection were explained in full to the patient. Risks outlined include but are not limited to infection, sepsis, bleeding, scarring, skin discoloration, temporary increase in pain, syncopal episode, failure to resolve symptoms, allergic reaction, symptom recurrence, and elevation of blood sugar in diabetics. Patient understood the risks. All questions were answered. After discussion of options, patient requested an injection. Oral informed consent was obtained and sterile prep was done of the injection site. Sterile technique was utilized for the procedure including the preparation of the solutions used for the injection. Patient tolerated the procedure well. Advised to ice the injection site this evening. Prep with ETOH  locally to site. Sterile technique used.  tendon sheath injected

## 2024-04-09 ENCOUNTER — APPOINTMENT (OUTPATIENT)
Dept: NEUROPSYCHOLOGY | Facility: CLINIC | Age: 39
End: 2024-04-09

## 2024-04-15 ENCOUNTER — APPOINTMENT (OUTPATIENT)
Dept: NEUROPSYCHOLOGY | Facility: CLINIC | Age: 39
End: 2024-04-15
Payer: OTHER MISCELLANEOUS

## 2024-04-15 PROCEDURE — 90834 PSYTX W PT 45 MINUTES: CPT

## 2024-04-15 NOTE — BEHAVIORAL HEALTH
[Prevent psychological harm to patient or another individual] : Prevent psychological harm to patient or another individual [FreeTextEntry2] : Diagnosis: F43.23 Adjustment Disorder with Mixed Anxiety and Depressed Mood F43.10 Post traumatic stress disorder F45.42 Pain disorder with related psychological factors  LT. The client will learn strategies to cope with losses and limitations 2. The client will verbalize positive constructive statements about self and the future 3. The client will identify and verbalize feelings regarding her medical conditions  ST. The client will identify and accept needed changes with her environment 2. The client will learn problem solving strategies to cope with changes 3. The client will set realistic personal goals that build on the client's positive characteristics.  The client was on time for her individual session. The client has to have a tooth pulled and is on antibiotics. Her doctor put her on 100mg Wellbutrin. The client was given further diagnosis of FCR and CRPS. She remains upset with her situation; she hates being disabled.  She is experiencing financial stress because of Workman's comp. The client will continue with therapy to address the above treatment goals. The worker provides active listening and explores CBT skills. Goals addressed in this session: LT, 2, 3 STG: ST, 2, 3

## 2024-04-18 ENCOUNTER — APPOINTMENT (OUTPATIENT)
Dept: PAIN MANAGEMENT | Facility: CLINIC | Age: 39
End: 2024-04-18
Payer: OTHER MISCELLANEOUS

## 2024-04-18 PROCEDURE — 99214 OFFICE O/P EST MOD 30 MIN: CPT | Mod: ACP

## 2024-04-18 RX ORDER — TRAZODONE HYDROCHLORIDE 150 MG/1
150 TABLET ORAL
Refills: 0 | Status: ACTIVE | COMMUNITY

## 2024-04-18 NOTE — DISCUSSION/SUMMARY
[Medication Risks Reviewed] : Medication risks reviewed [de-identified] : 39 y.old with persistent and severe right wrist pain which is not responding to conservative treatment, painfully decreased Right wrist ROM. Steroid injection has been done with no improvement. At this time it is recommended to continue with the Therapy, use TENS unit and continue medication management with Gabapentin and Diclofenac Sodium. We will re-evaluate in 4 weeks.  Disability status: Temporary total disability. Patient is unable to return to work at this time.   Total clinician time spent today on the patient is 30 minutes including preparing to see the patient, obtaining and/or reviewing and confirming history, performing medically necessary and appropriate examination, counseling and educating the patient and/or family, documenting clinical information in the EHR and communicating and/or referring to other healthcare professionals.  OSCAR Roman D.O.

## 2024-04-18 NOTE — PHYSICAL EXAM
[de-identified] : Right wrist - carpal tunnel scar well healed. Mild welling of the hand noted. Wrist ROM slight decreased. Mild hypersensitivity noted over the scar.
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
## 2024-04-22 ENCOUNTER — APPOINTMENT (OUTPATIENT)
Dept: NEUROPSYCHOLOGY | Facility: CLINIC | Age: 39
End: 2024-04-22

## 2024-04-29 ENCOUNTER — APPOINTMENT (OUTPATIENT)
Dept: NEUROPSYCHOLOGY | Facility: CLINIC | Age: 39
End: 2024-04-29
Payer: OTHER MISCELLANEOUS

## 2024-04-29 PROCEDURE — 90834 PSYTX W PT 45 MINUTES: CPT

## 2024-04-29 NOTE — BEHAVIORAL HEALTH
[Prevent psychological harm to patient or another individual] : Prevent psychological harm to patient or another individual [FreeTextEntry2] : Diagnosis: F43.23 Adjustment Disorder with Mixed Anxiety and Depressed Mood F43.10 Post traumatic stress disorder F45.42 Pain disorder with related psychological factors  LT. The client will learn strategies to cope with losses and limitations 2. The client will verbalize positive constructive statements about self and the future 3. The client will identify and verbalize feelings regarding her medical conditions  ST. The client will identify and accept needed changes with her environment 2. The client will learn problem solving strategies to cope with changes 3. The client will set realistic personal goals that build on the client's positive characteristics.  The client was on time for her individual session.  Her doctor put her on Wellbutrin, and she is allergic to it. The client is having difficulty getting approval for PT which she needs to regain the use of her hand.  The client continues to feel frustrated with the system. The client will continue with therapy to address the above treatment goals. The worker provides active listening and explores CBT skills. Goals addressed in this session: LT, 2, 3 STG: ST, 2, 3

## 2024-05-06 ENCOUNTER — APPOINTMENT (OUTPATIENT)
Dept: NEUROPSYCHOLOGY | Facility: CLINIC | Age: 39
End: 2024-05-06
Payer: OTHER MISCELLANEOUS

## 2024-05-06 PROCEDURE — 90834 PSYTX W PT 45 MINUTES: CPT

## 2024-05-06 NOTE — BEHAVIORAL HEALTH
[Prevent psychological harm to patient or another individual] : Prevent psychological harm to patient or another individual [FreeTextEntry2] : Diagnosis: F43.23 Adjustment Disorder with Mixed Anxiety and Depressed Mood F43.10 Post traumatic stress disorder F45.42 Pain disorder with related psychological factors  LT. The client will learn strategies to cope with losses and limitations 2. The client will verbalize positive constructive statements about self and the future 3. The client will identify and verbalize feelings regarding her medical conditions  ST. The client will identify and accept needed changes with her environment 2. The client will learn problem solving strategies to cope with changes 3. The client will set realistic personal goals that build on the client's positive characteristics.  The client was on time for her individual session. She is very upset due to her daughter being arrested for shop lifting.  Her daughter is not guilty because she never left the store, and the  jumped the gun.  They are getting a .  The client finally received approval for PT. The client is in physical and emotional pain.  The client will continue with therapy to address the above treatment goals. The worker provides active listening and explores CBT skills. Goals addressed in this session: LT, 2, 3 STG: ST, 2, 3

## 2024-05-13 ENCOUNTER — APPOINTMENT (OUTPATIENT)
Dept: NEUROPSYCHOLOGY | Facility: CLINIC | Age: 39
End: 2024-05-13

## 2024-05-14 ENCOUNTER — APPOINTMENT (OUTPATIENT)
Dept: ORTHOPEDIC SURGERY | Facility: CLINIC | Age: 39
End: 2024-05-14
Payer: OTHER MISCELLANEOUS

## 2024-05-14 ENCOUNTER — NON-APPOINTMENT (OUTPATIENT)
Age: 39
End: 2024-05-14

## 2024-05-14 DIAGNOSIS — M65.9 SYNOVITIS AND TENOSYNOVITIS, UNSPECIFIED: ICD-10-CM

## 2024-05-14 PROCEDURE — 20550 NJX 1 TENDON SHEATH/LIGAMENT: CPT

## 2024-05-14 PROCEDURE — 99213 OFFICE O/P EST LOW 20 MIN: CPT | Mod: 25

## 2024-05-14 NOTE — PHYSICAL EXAM
[de-identified] : Patient is tenderness to palpation along the flexor carpi radialis.  She has pain with resisted wrist flexion.  He has no erythema ecchymoses or abrasions.  There is no masses.

## 2024-05-14 NOTE — HISTORY OF PRESENT ILLNESS
[de-identified] : 39-year-old female still with pain discomfort to right hand and wrist area.  She is not working.  She did say that she got some relief with her cortisone injection at her last visit.  There was a lapse in some therapy.  She comes in for the evaluation today.  She is totally disabled.  She is applying for Social Security disability.

## 2024-05-14 NOTE — PROCEDURE
[FreeTextEntry3] : FCR sheath r injection was performed because of pain inflammation and stiffness Anesthesia: ethyl chloride sprayed topically Dexamethasone injection of 1cc 4mg/ml Lidocaine: An injection of Lidocaine 1% 1cc  Patient has tried OTC's including aspirin, Ibuprofen, Aleve etc or prescription NSAIDS, and/or exercises at home and/ or physical therapy without satisfactory response. After verbal consent using sterile preparation and technique. The risks, benefits, and alternatives to cortisone injection were explained in full to the patient. Risks outlined include but are not limited to infection, sepsis, bleeding, scarring, skin discoloration, temporary increase in pain, syncopal episode, failure to resolve symptoms, allergic reaction, symptom recurrence, and elevation of blood sugar in diabetics. Patient understood the risks. All questions were answered. After discussion of options, patient requested an injection. Oral informed consent was obtained and sterile prep was done of the injection site. Sterile technique was utilized for the procedure including the preparation of the solutions used for the injection. Patient tolerated the procedure well. Advised to ice the injection site this evening. Prep with ETOH  locally to site. Sterile technique used.  tendon sheath injected Right flexor carpi radialis sheath

## 2024-05-14 NOTE — ASSESSMENT
[FreeTextEntry1] : I decided to give the patient another cortisone injection to the area of the flexor carpi radialis.  She will continue with the therapy program.  She will see me back in 6 weeks.  She continues to treat with pain management as well.

## 2024-05-16 ENCOUNTER — APPOINTMENT (OUTPATIENT)
Dept: PAIN MANAGEMENT | Facility: CLINIC | Age: 39
End: 2024-05-16
Payer: OTHER MISCELLANEOUS

## 2024-05-16 DIAGNOSIS — F45.42 PAIN DISORDER WITH RELATED PSYCHOLOGICAL FACTORS: ICD-10-CM

## 2024-05-16 DIAGNOSIS — M77.8 OTHER ENTHESOPATHIES, NOT ELSEWHERE CLASSIFIED: ICD-10-CM

## 2024-05-16 PROCEDURE — 99214 OFFICE O/P EST MOD 30 MIN: CPT | Mod: ACP

## 2024-05-16 RX ORDER — GABAPENTIN 600 MG/1
600 TABLET, COATED ORAL
Qty: 90 | Refills: 1 | Status: ACTIVE | COMMUNITY
Start: 2023-06-06 | End: 1900-01-01

## 2024-05-16 RX ORDER — DICLOFENAC SODIUM 75 MG/1
75 TABLET, DELAYED RELEASE ORAL TWICE DAILY
Qty: 60 | Refills: 1 | Status: ACTIVE | COMMUNITY
Start: 2024-02-22 | End: 1900-01-01
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
## 2024-05-16 NOTE — PHYSICAL EXAM
[de-identified] : Right wrist - carpal tunnel scar well healed. Mild welling of the hand noted. Wrist ROM slight decreased. Mild hypersensitivity noted over the scar.

## 2024-05-16 NOTE — DISCUSSION/SUMMARY
[Medication Risks Reviewed] : Medication risks reviewed [de-identified] : 39 y.old with persistent and severe right wrist pain which is not responding to conservative treatment, painfully decreased Right wrist ROM. Steroid injection has been done with no improvement. At this time it is recommended to continue with the Therapy, use TENS unit and continue medication management with Gabapentin and Diclofenac Sodium. We will re-evaluate in 8 weeks.  Disability status: Temporary total disability. Patient is unable to return to work at this time.   Total clinician time spent today on the patient is 30 minutes including preparing to see the patient, obtaining and/or reviewing and confirming history, performing medically necessary and appropriate examination, counseling and educating the patient and/or family, documenting clinical information in the EHR and communicating and/or referring to other healthcare professionals.  OSCAR Roman D.O.

## 2024-05-22 ENCOUNTER — APPOINTMENT (OUTPATIENT)
Dept: NEUROPSYCHOLOGY | Facility: CLINIC | Age: 39
End: 2024-05-22

## 2024-06-03 ENCOUNTER — APPOINTMENT (OUTPATIENT)
Dept: NEUROPSYCHOLOGY | Facility: CLINIC | Age: 39
End: 2024-06-03

## 2024-06-04 ENCOUNTER — APPOINTMENT (OUTPATIENT)
Dept: NEUROPSYCHOLOGY | Facility: CLINIC | Age: 39
End: 2024-06-04

## 2024-06-11 ENCOUNTER — APPOINTMENT (OUTPATIENT)
Dept: NEUROPSYCHOLOGY | Facility: CLINIC | Age: 39
End: 2024-06-11
Payer: OTHER MISCELLANEOUS

## 2024-06-11 PROCEDURE — 90834 PSYTX W PT 45 MINUTES: CPT

## 2024-06-11 NOTE — BEHAVIORAL HEALTH
[Prevent psychological harm to patient or another individual] : Prevent psychological harm to patient or another individual [FreeTextEntry2] : Diagnosis: F43.23 Adjustment Disorder with Mixed Anxiety and Depressed Mood F43.10 Post traumatic stress disorder F45.42 Pain disorder with related psychological factors  LT. The client will learn strategies to cope with losses and limitations 2. The client will verbalize positive constructive statements about self and the future 3. The client will identify and verbalize feelings regarding her medical conditions  ST. The client will identify and accept needed changes with her environment 2. The client will learn problem solving strategies to cope with changes 3. The client will set realistic personal goals that build on the client's positive characteristics.  The client was on time for her individual session. The client continues to deal with financial stress due to decreased workman's comp.  She feels sad and anxious most days. She finds it difficult to leave the house. She is on medication which decreased her anxiety somewhat. The client will continue with therapy to address the above treatment goals. The worker provides active listening and explores CBT skills. Goals addressed in this session: LT, 2, 3 STG: ST, 2, 3

## 2024-06-18 ENCOUNTER — APPOINTMENT (OUTPATIENT)
Dept: NEUROPSYCHOLOGY | Facility: CLINIC | Age: 39
End: 2024-06-18
Payer: OTHER MISCELLANEOUS

## 2024-06-18 PROCEDURE — 90834 PSYTX W PT 45 MINUTES: CPT

## 2024-06-18 NOTE — BEHAVIORAL HEALTH
[Prevent psychological harm to patient or another individual] : Prevent psychological harm to patient or another individual [FreeTextEntry2] : Diagnosis: F43.23 Adjustment Disorder with Mixed Anxiety and Depressed Mood F43.10 Post traumatic stress disorder F45.42 Pain disorder with related psychological factors  LT. The client will learn strategies to cope with losses and limitations 2. The client will verbalize positive constructive statements about self and the future 3. The client will identify and verbalize feelings regarding her medical conditions  ST. The client will identify and accept needed changes with her environment 2. The client will learn problem solving strategies to cope with changes 3. The client will set realistic personal goals that build on the client's positive characteristics.  The client was on time for her individual session. The client continues to deal chronic physical pain.  She is having difficulty with getting reimbursement from Work[employer]'s [employer]. We explored how her PTSD is affecting her lifestyle. The client will continue with therapy to address the above treatment goals. The worker provides active listening and explores CBT skills. Goals addressed in this session: LT, 2, 3 STG: ST, 2, 3

## 2024-06-25 ENCOUNTER — APPOINTMENT (OUTPATIENT)
Dept: NEUROPSYCHOLOGY | Facility: CLINIC | Age: 39
End: 2024-06-25
Payer: OTHER MISCELLANEOUS

## 2024-06-25 PROCEDURE — 90834 PSYTX W PT 45 MINUTES: CPT

## 2024-06-28 ENCOUNTER — APPOINTMENT (OUTPATIENT)
Dept: ORTHOPEDIC SURGERY | Facility: CLINIC | Age: 39
End: 2024-06-28
Payer: OTHER MISCELLANEOUS

## 2024-06-28 ENCOUNTER — NON-APPOINTMENT (OUTPATIENT)
Age: 39
End: 2024-06-28

## 2024-06-28 DIAGNOSIS — G56.01 CARPAL TUNNEL SYNDROME, RIGHT UPPER LIMB: ICD-10-CM

## 2024-06-28 DIAGNOSIS — G90.519 COMPLEX REGIONAL PAIN SYNDROME I OF UNSPECIFIED UPPER LIMB: ICD-10-CM

## 2024-06-28 PROCEDURE — 99213 OFFICE O/P EST LOW 20 MIN: CPT

## 2024-07-02 ENCOUNTER — APPOINTMENT (OUTPATIENT)
Dept: NEUROPSYCHOLOGY | Facility: CLINIC | Age: 39
End: 2024-07-02

## 2024-07-09 ENCOUNTER — APPOINTMENT (OUTPATIENT)
Dept: NEUROPSYCHOLOGY | Facility: CLINIC | Age: 39
End: 2024-07-09
Payer: OTHER MISCELLANEOUS

## 2024-07-09 PROCEDURE — 90834 PSYTX W PT 45 MINUTES: CPT

## 2024-07-11 ENCOUNTER — APPOINTMENT (OUTPATIENT)
Dept: PAIN MANAGEMENT | Facility: CLINIC | Age: 39
End: 2024-07-11

## 2024-07-16 ENCOUNTER — APPOINTMENT (OUTPATIENT)
Dept: NEUROPSYCHOLOGY | Facility: CLINIC | Age: 39
End: 2024-07-16
Payer: OTHER MISCELLANEOUS

## 2024-07-16 PROCEDURE — 90834 PSYTX W PT 45 MINUTES: CPT

## 2024-07-23 ENCOUNTER — APPOINTMENT (OUTPATIENT)
Dept: NEUROPSYCHOLOGY | Facility: CLINIC | Age: 39
End: 2024-07-23

## 2024-07-30 ENCOUNTER — APPOINTMENT (OUTPATIENT)
Dept: NEUROPSYCHOLOGY | Facility: CLINIC | Age: 39
End: 2024-07-30
Payer: OTHER MISCELLANEOUS

## 2024-07-30 PROCEDURE — 90834 PSYTX W PT 45 MINUTES: CPT

## 2024-07-30 NOTE — BEHAVIORAL HEALTH
[Prevent psychological harm to patient or another individual] : Prevent psychological harm to patient or another individual [FreeTextEntry2] : Diagnosis: F43.23 Adjustment Disorder with Mixed Anxiety and Depressed Mood F43.10 Post traumatic stress disorder F45.42 Pain disorder with related psychological factors  LT. The client will learn strategies to cope with losses and limitations 2. The client will verbalize positive constructive statements about self and the future 3. The client will identify and verbalize feelings regarding her medical conditions  ST. The client will identify and accept needed changes with her environment 2. The client will learn problem solving strategies to cope with changes 3. The client will set realistic personal goals that build on the client's positive characteristics.  The client was on time for her individual session. The client continues to have physical pain. She is frustrated with Work[employer]'s [employer]. because they are not paying her appropriately.  She is trying to be patient with people in her life. She is going to look into disability. The client will continue with therapy to address the above treatment goals. The worker provides active listening and explores CBT skills. Goals addressed in this session: LT, 2, 3 STG: ST, 2, 3

## 2024-08-05 ENCOUNTER — APPOINTMENT (OUTPATIENT)
Dept: ORTHOPEDIC SURGERY | Facility: CLINIC | Age: 39
End: 2024-08-05

## 2024-08-05 ENCOUNTER — NON-APPOINTMENT (OUTPATIENT)
Age: 39
End: 2024-08-05

## 2024-08-05 PROCEDURE — 99213 OFFICE O/P EST LOW 20 MIN: CPT | Mod: 25

## 2024-08-05 PROCEDURE — 20550 NJX 1 TENDON SHEATH/LIGAMENT: CPT | Mod: RT

## 2024-08-05 NOTE — HISTORY OF PRESENT ILLNESS
[de-identified] : 39-year-old female chronic pain discomfort in the right wrist and hand area.  She did" undergo carpal tunnel release surgery without significant changes in her symptoms.  She does have and is being treated for reflex sympathetic dystrophy currently takes gabapentin on a regular basis.  Not working.  She is at the same level of disability.  No further surgeries are being planned.

## 2024-08-05 NOTE — PROCEDURE
[FreeTextEntry3] : Flexor carpi radialis injection was performed because of pain inflammation and stiffness Anesthesia: ethyl chloride sprayed topically Dexamethasone injection of 1cc 4mg/ml Lidocaine: An injection of Lidocaine 1% 1cc  Patient has tried OTC's including aspirin, Ibuprofen, Aleve etc or prescription NSAIDS, and/or exercises at home and/ or physical therapy without satisfactory response. After verbal consent using sterile preparation and technique. The risks, benefits, and alternatives to cortisone injection were explained in full to the patient. Risks outlined include but are not limited to infection, sepsis, bleeding, scarring, skin discoloration, temporary increase in pain, syncopal episode, failure to resolve symptoms, allergic reaction, symptom recurrence, and elevation of blood sugar in diabetics. Patient understood the risks. All questions were answered. After discussion of options, patient requested an injection. Oral informed consent was obtained and sterile prep was done of the injection site. Sterile technique was utilized for the procedure including the preparation of the solutions used for the injection. Patient tolerated the procedure well. Advised to ice the injection site this evening. Prep with ETOH  locally to site. Sterile technique used.  tendon sheath injected Right wrist flexor carpi radialis tendon sheath

## 2024-08-05 NOTE — ASSESSMENT
[FreeTextEntry1] : Patient has chronic symptoms in the right arm.  Condition is guarded.  She will receive a second cortisone injection to the area of the flexor carpi radialis.  She did states she got some relief of symptoms in that area with that injection though not complete relief of all of her symptoms.  She will continue to require gabapentin given by pain management physicians.  Will see me back in 6 weeks.  She will have permanent impairment.

## 2024-08-06 ENCOUNTER — APPOINTMENT (OUTPATIENT)
Dept: NEUROPSYCHOLOGY | Facility: CLINIC | Age: 39
End: 2024-08-06

## 2024-08-06 PROCEDURE — 90834 PSYTX W PT 45 MINUTES: CPT
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
## 2024-08-13 ENCOUNTER — APPOINTMENT (OUTPATIENT)
Dept: NEUROPSYCHOLOGY | Facility: CLINIC | Age: 39
End: 2024-08-13
Payer: OTHER MISCELLANEOUS

## 2024-08-13 PROCEDURE — 90834 PSYTX W PT 45 MINUTES: CPT
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
## 2024-08-19 ENCOUNTER — APPOINTMENT (OUTPATIENT)
Dept: NEUROPSYCHOLOGY | Facility: CLINIC | Age: 39
End: 2024-08-19

## 2024-08-20 ENCOUNTER — APPOINTMENT (OUTPATIENT)
Dept: NEUROPSYCHOLOGY | Facility: CLINIC | Age: 39
End: 2024-08-20

## 2024-08-26 ENCOUNTER — APPOINTMENT (OUTPATIENT)
Dept: NEUROPSYCHOLOGY | Facility: CLINIC | Age: 39
End: 2024-08-26

## 2024-08-27 ENCOUNTER — APPOINTMENT (OUTPATIENT)
Dept: NEUROPSYCHOLOGY | Facility: CLINIC | Age: 39
End: 2024-08-27

## 2024-09-03 ENCOUNTER — APPOINTMENT (OUTPATIENT)
Dept: NEUROPSYCHOLOGY | Facility: CLINIC | Age: 39
End: 2024-09-03

## 2024-09-04 ENCOUNTER — APPOINTMENT (OUTPATIENT)
Dept: NEUROPSYCHOLOGY | Facility: CLINIC | Age: 39
End: 2024-09-04
Payer: OTHER MISCELLANEOUS

## 2024-09-04 PROCEDURE — 90834 PSYTX W PT 45 MINUTES: CPT

## 2024-09-10 ENCOUNTER — APPOINTMENT (OUTPATIENT)
Dept: NEUROPSYCHOLOGY | Facility: CLINIC | Age: 39
End: 2024-09-10

## 2024-09-16 ENCOUNTER — APPOINTMENT (OUTPATIENT)
Dept: ORTHOPEDIC SURGERY | Facility: CLINIC | Age: 39
End: 2024-09-16
Payer: OTHER MISCELLANEOUS

## 2024-09-16 ENCOUNTER — NON-APPOINTMENT (OUTPATIENT)
Age: 39
End: 2024-09-16

## 2024-09-16 DIAGNOSIS — G90.519 COMPLEX REGIONAL PAIN SYNDROME I OF UNSPECIFIED UPPER LIMB: ICD-10-CM

## 2024-09-16 DIAGNOSIS — G56.01 CARPAL TUNNEL SYNDROME, RIGHT UPPER LIMB: ICD-10-CM

## 2024-09-16 DIAGNOSIS — M77.8 OTHER ENTHESOPATHIES, NOT ELSEWHERE CLASSIFIED: ICD-10-CM

## 2024-09-16 PROCEDURE — 99213 OFFICE O/P EST LOW 20 MIN: CPT

## 2024-09-16 NOTE — PHYSICAL EXAM
[de-identified] : Patient has tenderness along the flexor carpi radialis area.  She has mild swelling present.  Normal capillary refill.  Limited grasp

## 2024-09-16 NOTE — HISTORY OF PRESENT ILLNESS
[de-identified] : 39-year-old female has chronic pain discomfort in her right hand.  Her clinical condition is unchanged.  Her work status is unchanged and her level of disability is unchanged she does report that she feels better and functions better for activities of daily living when she is in an active therapy program she currently is not in therapy and it is more difficult for her to do simple tasks in her home she has more stiffness in the morning when she is not in therapy

## 2024-09-16 NOTE — ASSESSMENT
[FreeTextEntry1] : Patient has chronic pain discomfort in the right hand and wrist area.  I think that therapy is still indicated and therefore is being requested and the reason the therapy should be reindicated is because with therapy she has an improvement in her activities of daily living not be able to return back to her work activities normal tasks in the home are improved while she is in the therapy program she continues to go to mental health therapy as well.

## 2024-09-17 ENCOUNTER — APPOINTMENT (OUTPATIENT)
Dept: NEUROPSYCHOLOGY | Facility: CLINIC | Age: 39
End: 2024-09-17

## 2024-09-18 ENCOUNTER — APPOINTMENT (OUTPATIENT)
Dept: NEUROPSYCHOLOGY | Facility: CLINIC | Age: 39
End: 2024-09-18
Payer: OTHER MISCELLANEOUS

## 2024-09-18 PROCEDURE — 90832 PSYTX W PT 30 MINUTES: CPT

## 2024-09-23 ENCOUNTER — APPOINTMENT (OUTPATIENT)
Dept: NEUROPSYCHOLOGY | Facility: CLINIC | Age: 39
End: 2024-09-23

## 2024-09-24 ENCOUNTER — APPOINTMENT (OUTPATIENT)
Dept: NEUROPSYCHOLOGY | Facility: CLINIC | Age: 39
End: 2024-09-24

## 2024-09-25 ENCOUNTER — APPOINTMENT (OUTPATIENT)
Dept: NEUROPSYCHOLOGY | Facility: CLINIC | Age: 39
End: 2024-09-25

## 2024-10-02 ENCOUNTER — APPOINTMENT (OUTPATIENT)
Dept: NEUROPSYCHOLOGY | Facility: CLINIC | Age: 39
End: 2024-10-02
Payer: OTHER MISCELLANEOUS

## 2024-10-02 PROCEDURE — 90834 PSYTX W PT 45 MINUTES: CPT

## 2024-10-09 ENCOUNTER — APPOINTMENT (OUTPATIENT)
Dept: NEUROPSYCHOLOGY | Facility: CLINIC | Age: 39
End: 2024-10-09
Payer: OTHER MISCELLANEOUS

## 2024-10-09 ENCOUNTER — APPOINTMENT (OUTPATIENT)
Dept: NEUROPSYCHOLOGY | Facility: CLINIC | Age: 39
End: 2024-10-09

## 2024-10-09 PROCEDURE — 90834 PSYTX W PT 45 MINUTES: CPT

## 2024-10-19 ENCOUNTER — NON-APPOINTMENT (OUTPATIENT)
Age: 39
End: 2024-10-19

## 2024-10-21 ENCOUNTER — APPOINTMENT (OUTPATIENT)
Dept: NEUROPSYCHOLOGY | Facility: CLINIC | Age: 39
End: 2024-10-21

## 2024-10-28 ENCOUNTER — NON-APPOINTMENT (OUTPATIENT)
Age: 39
End: 2024-10-28

## 2024-10-28 ENCOUNTER — APPOINTMENT (OUTPATIENT)
Dept: ORTHOPEDIC SURGERY | Facility: CLINIC | Age: 39
End: 2024-10-28
Payer: OTHER MISCELLANEOUS

## 2024-10-28 PROCEDURE — 99213 OFFICE O/P EST LOW 20 MIN: CPT

## 2024-10-30 ENCOUNTER — APPOINTMENT (OUTPATIENT)
Dept: NEUROPSYCHOLOGY | Facility: CLINIC | Age: 39
End: 2024-10-30

## 2024-11-05 ENCOUNTER — APPOINTMENT (OUTPATIENT)
Dept: PAIN MANAGEMENT | Facility: CLINIC | Age: 39
End: 2024-11-05
Payer: OTHER MISCELLANEOUS

## 2024-11-05 DIAGNOSIS — M25.539 PAIN IN UNSPECIFIED WRIST: ICD-10-CM

## 2024-11-05 DIAGNOSIS — G90.519 COMPLEX REGIONAL PAIN SYNDROME I OF UNSPECIFIED UPPER LIMB: ICD-10-CM

## 2024-11-05 PROCEDURE — 99214 OFFICE O/P EST MOD 30 MIN: CPT

## 2024-11-05 RX ORDER — IBUPROFEN 800 MG/1
800 TABLET, FILM COATED ORAL 3 TIMES DAILY
Qty: 90 | Refills: 1 | Status: ACTIVE | COMMUNITY
Start: 2024-11-05 | End: 1900-01-01

## 2024-11-12 ENCOUNTER — APPOINTMENT (OUTPATIENT)
Dept: NEUROPSYCHOLOGY | Facility: CLINIC | Age: 39
End: 2024-11-12
Payer: OTHER MISCELLANEOUS

## 2024-11-12 PROCEDURE — 90834 PSYTX W PT 45 MINUTES: CPT

## 2024-11-19 ENCOUNTER — APPOINTMENT (OUTPATIENT)
Dept: NEUROPSYCHOLOGY | Facility: CLINIC | Age: 39
End: 2024-11-19
Payer: OTHER MISCELLANEOUS

## 2024-11-19 PROCEDURE — 90834 PSYTX W PT 45 MINUTES: CPT

## 2024-11-27 ENCOUNTER — APPOINTMENT (OUTPATIENT)
Dept: NEUROPSYCHOLOGY | Facility: CLINIC | Age: 39
End: 2024-11-27

## 2024-12-09 ENCOUNTER — APPOINTMENT (OUTPATIENT)
Dept: ORTHOPEDIC SURGERY | Facility: CLINIC | Age: 39
End: 2024-12-09

## 2024-12-09 ENCOUNTER — NON-APPOINTMENT (OUTPATIENT)
Age: 39
End: 2024-12-09

## 2024-12-09 DIAGNOSIS — M65.939 UNSPECIFIED SYNOVITIS AND TENOSYNOVITIS, UNSPECIFIED FOREARM: ICD-10-CM

## 2024-12-09 PROCEDURE — 99213 OFFICE O/P EST LOW 20 MIN: CPT | Mod: 25

## 2024-12-09 PROCEDURE — 20550 NJX 1 TENDON SHEATH/LIGAMENT: CPT | Mod: RT

## 2024-12-11 ENCOUNTER — APPOINTMENT (OUTPATIENT)
Dept: NEUROPSYCHOLOGY | Facility: CLINIC | Age: 39
End: 2024-12-11

## 2024-12-17 ENCOUNTER — APPOINTMENT (OUTPATIENT)
Dept: NEUROPSYCHOLOGY | Facility: CLINIC | Age: 39
End: 2024-12-17

## 2024-12-20 ENCOUNTER — NON-APPOINTMENT (OUTPATIENT)
Age: 39
End: 2024-12-20

## 2025-01-07 ENCOUNTER — APPOINTMENT (OUTPATIENT)
Dept: NEUROPSYCHOLOGY | Facility: CLINIC | Age: 40
End: 2025-01-07
Payer: OTHER MISCELLANEOUS

## 2025-01-07 PROCEDURE — 90791 PSYCH DIAGNOSTIC EVALUATION: CPT

## 2025-01-08 ENCOUNTER — APPOINTMENT (OUTPATIENT)
Dept: NEUROPSYCHOLOGY | Facility: CLINIC | Age: 40
End: 2025-01-08

## 2025-01-08 ENCOUNTER — APPOINTMENT (OUTPATIENT)
Dept: PAIN MANAGEMENT | Facility: CLINIC | Age: 40
End: 2025-01-08
Payer: OTHER MISCELLANEOUS

## 2025-01-08 PROCEDURE — 99075 MEDICAL TESTIMONY: CPT

## 2025-01-16 ENCOUNTER — APPOINTMENT (OUTPATIENT)
Dept: NEUROPSYCHOLOGY | Facility: CLINIC | Age: 40
End: 2025-01-16
Payer: OTHER MISCELLANEOUS

## 2025-01-16 PROCEDURE — 90834 PSYTX W PT 45 MINUTES: CPT

## 2025-01-21 ENCOUNTER — NON-APPOINTMENT (OUTPATIENT)
Age: 40
End: 2025-01-21

## 2025-01-21 ENCOUNTER — APPOINTMENT (OUTPATIENT)
Dept: ORTHOPEDIC SURGERY | Facility: CLINIC | Age: 40
End: 2025-01-21
Payer: OTHER MISCELLANEOUS

## 2025-01-21 DIAGNOSIS — M65.939 UNSPECIFIED SYNOVITIS AND TENOSYNOVITIS, UNSPECIFIED FOREARM: ICD-10-CM

## 2025-01-21 DIAGNOSIS — G90.519 COMPLEX REGIONAL PAIN SYNDROME I OF UNSPECIFIED UPPER LIMB: ICD-10-CM

## 2025-01-21 DIAGNOSIS — G56.01 CARPAL TUNNEL SYNDROME, RIGHT UPPER LIMB: ICD-10-CM

## 2025-01-21 PROCEDURE — 99213 OFFICE O/P EST LOW 20 MIN: CPT

## 2025-01-28 ENCOUNTER — APPOINTMENT (OUTPATIENT)
Dept: NEUROPSYCHOLOGY | Facility: CLINIC | Age: 40
End: 2025-01-28
Payer: OTHER MISCELLANEOUS

## 2025-01-28 PROCEDURE — 90834 PSYTX W PT 45 MINUTES: CPT

## 2025-02-04 ENCOUNTER — APPOINTMENT (OUTPATIENT)
Dept: PAIN MANAGEMENT | Facility: CLINIC | Age: 40
End: 2025-02-04
Payer: OTHER MISCELLANEOUS

## 2025-02-04 DIAGNOSIS — M77.8 OTHER ENTHESOPATHIES, NOT ELSEWHERE CLASSIFIED: ICD-10-CM

## 2025-02-04 DIAGNOSIS — M25.539 PAIN IN UNSPECIFIED WRIST: ICD-10-CM

## 2025-02-04 PROCEDURE — 99214 OFFICE O/P EST MOD 30 MIN: CPT

## 2025-02-04 RX ORDER — LIDOCAINE 5% 700 MG/1
5 PATCH TOPICAL
Qty: 60 | Refills: 0 | Status: ACTIVE | COMMUNITY
Start: 2025-02-04 | End: 1900-01-01

## 2025-02-05 ENCOUNTER — APPOINTMENT (OUTPATIENT)
Dept: NEUROPSYCHOLOGY | Facility: CLINIC | Age: 40
End: 2025-02-05
Payer: OTHER MISCELLANEOUS

## 2025-02-05 PROCEDURE — 90834 PSYTX W PT 45 MINUTES: CPT

## 2025-02-12 ENCOUNTER — APPOINTMENT (OUTPATIENT)
Dept: NEUROPSYCHOLOGY | Facility: CLINIC | Age: 40
End: 2025-02-12
Payer: OTHER MISCELLANEOUS

## 2025-02-12 PROCEDURE — 90834 PSYTX W PT 45 MINUTES: CPT

## 2025-02-19 ENCOUNTER — APPOINTMENT (OUTPATIENT)
Dept: NEUROPSYCHOLOGY | Facility: CLINIC | Age: 40
End: 2025-02-19

## 2025-03-04 ENCOUNTER — APPOINTMENT (OUTPATIENT)
Dept: ORTHOPEDIC SURGERY | Facility: CLINIC | Age: 40
End: 2025-03-04
Payer: OTHER MISCELLANEOUS

## 2025-03-04 ENCOUNTER — NON-APPOINTMENT (OUTPATIENT)
Age: 40
End: 2025-03-04

## 2025-03-04 DIAGNOSIS — G56.01 CARPAL TUNNEL SYNDROME, RIGHT UPPER LIMB: ICD-10-CM

## 2025-03-04 DIAGNOSIS — G90.519 COMPLEX REGIONAL PAIN SYNDROME I OF UNSPECIFIED UPPER LIMB: ICD-10-CM

## 2025-03-04 PROCEDURE — 99213 OFFICE O/P EST LOW 20 MIN: CPT

## 2025-03-05 ENCOUNTER — APPOINTMENT (OUTPATIENT)
Dept: NEUROPSYCHOLOGY | Facility: CLINIC | Age: 40
End: 2025-03-05
Payer: OTHER MISCELLANEOUS

## 2025-03-05 PROCEDURE — 90834 PSYTX W PT 45 MINUTES: CPT

## 2025-03-19 ENCOUNTER — APPOINTMENT (OUTPATIENT)
Dept: NEUROPSYCHOLOGY | Facility: CLINIC | Age: 40
End: 2025-03-19

## 2025-04-02 ENCOUNTER — APPOINTMENT (OUTPATIENT)
Dept: NEUROPSYCHOLOGY | Facility: CLINIC | Age: 40
End: 2025-04-02

## 2025-04-08 ENCOUNTER — APPOINTMENT (OUTPATIENT)
Dept: PAIN MANAGEMENT | Facility: CLINIC | Age: 40
End: 2025-04-08
Payer: OTHER MISCELLANEOUS

## 2025-04-08 DIAGNOSIS — M25.539 PAIN IN UNSPECIFIED WRIST: ICD-10-CM

## 2025-04-08 DIAGNOSIS — G90.519 COMPLEX REGIONAL PAIN SYNDROME I OF UNSPECIFIED UPPER LIMB: ICD-10-CM

## 2025-04-08 DIAGNOSIS — G56.01 CARPAL TUNNEL SYNDROME, RIGHT UPPER LIMB: ICD-10-CM

## 2025-04-08 PROCEDURE — 99214 OFFICE O/P EST MOD 30 MIN: CPT

## 2025-04-16 ENCOUNTER — APPOINTMENT (OUTPATIENT)
Dept: NEUROPSYCHOLOGY | Facility: CLINIC | Age: 40
End: 2025-04-16

## 2025-04-22 ENCOUNTER — APPOINTMENT (OUTPATIENT)
Dept: NEUROPSYCHOLOGY | Facility: CLINIC | Age: 40
End: 2025-04-22

## 2025-04-30 ENCOUNTER — APPOINTMENT (OUTPATIENT)
Dept: NEUROPSYCHOLOGY | Facility: CLINIC | Age: 40
End: 2025-04-30
Payer: OTHER MISCELLANEOUS

## 2025-04-30 PROCEDURE — 90832 PSYTX W PT 30 MINUTES: CPT

## 2025-05-06 ENCOUNTER — APPOINTMENT (OUTPATIENT)
Dept: PAIN MANAGEMENT | Facility: CLINIC | Age: 40
End: 2025-05-06
Payer: OTHER MISCELLANEOUS

## 2025-05-06 DIAGNOSIS — G90.519 COMPLEX REGIONAL PAIN SYNDROME I OF UNSPECIFIED UPPER LIMB: ICD-10-CM

## 2025-05-06 DIAGNOSIS — M25.539 PAIN IN UNSPECIFIED WRIST: ICD-10-CM

## 2025-05-06 DIAGNOSIS — G56.01 CARPAL TUNNEL SYNDROME, RIGHT UPPER LIMB: ICD-10-CM

## 2025-05-06 PROCEDURE — 20526 THER INJECTION CARP TUNNEL: CPT | Mod: RT

## 2025-05-06 PROCEDURE — 99214 OFFICE O/P EST MOD 30 MIN: CPT | Mod: 25

## 2025-05-14 ENCOUNTER — APPOINTMENT (OUTPATIENT)
Dept: NEUROPSYCHOLOGY | Facility: CLINIC | Age: 40
End: 2025-05-14

## 2025-05-28 ENCOUNTER — APPOINTMENT (OUTPATIENT)
Dept: NEUROPSYCHOLOGY | Facility: CLINIC | Age: 40
End: 2025-05-28

## 2025-06-03 ENCOUNTER — APPOINTMENT (OUTPATIENT)
Dept: PAIN MANAGEMENT | Facility: CLINIC | Age: 40
End: 2025-06-03

## 2025-06-09 ENCOUNTER — APPOINTMENT (OUTPATIENT)
Dept: ORTHOPEDIC SURGERY | Facility: CLINIC | Age: 40
End: 2025-06-09

## 2025-06-10 ENCOUNTER — APPOINTMENT (OUTPATIENT)
Dept: ORTHOPEDIC SURGERY | Facility: CLINIC | Age: 40
End: 2025-06-10

## 2025-06-11 ENCOUNTER — APPOINTMENT (OUTPATIENT)
Dept: NEUROPSYCHOLOGY | Facility: CLINIC | Age: 40
End: 2025-06-11

## 2025-06-12 ENCOUNTER — APPOINTMENT (OUTPATIENT)
Dept: ORTHOPEDIC SURGERY | Facility: CLINIC | Age: 40
End: 2025-06-12
Payer: OTHER MISCELLANEOUS

## 2025-06-12 ENCOUNTER — NON-APPOINTMENT (OUTPATIENT)
Age: 40
End: 2025-06-12

## 2025-06-12 PROCEDURE — 99213 OFFICE O/P EST LOW 20 MIN: CPT

## 2025-06-26 ENCOUNTER — APPOINTMENT (OUTPATIENT)
Dept: NEUROPSYCHOLOGY | Facility: CLINIC | Age: 40
End: 2025-06-26
Payer: OTHER MISCELLANEOUS

## 2025-06-26 PROCEDURE — 90834 PSYTX W PT 45 MINUTES: CPT | Mod: 95

## 2025-07-01 ENCOUNTER — NON-APPOINTMENT (OUTPATIENT)
Age: 40
End: 2025-07-01

## 2025-07-01 ENCOUNTER — APPOINTMENT (OUTPATIENT)
Dept: ORTHOPEDIC SURGERY | Facility: CLINIC | Age: 40
End: 2025-07-01
Payer: OTHER MISCELLANEOUS

## 2025-07-01 ENCOUNTER — APPOINTMENT (OUTPATIENT)
Dept: NEUROPSYCHOLOGY | Facility: CLINIC | Age: 40
End: 2025-07-01

## 2025-07-01 PROCEDURE — 99213 OFFICE O/P EST LOW 20 MIN: CPT

## 2025-07-10 ENCOUNTER — APPOINTMENT (OUTPATIENT)
Dept: NEUROPSYCHOLOGY | Facility: CLINIC | Age: 40
End: 2025-07-10
Payer: OTHER MISCELLANEOUS

## 2025-07-10 PROCEDURE — 90834 PSYTX W PT 45 MINUTES: CPT

## 2025-07-21 ENCOUNTER — APPOINTMENT (OUTPATIENT)
Dept: PAIN MANAGEMENT | Facility: CLINIC | Age: 40
End: 2025-07-21
Payer: OTHER MISCELLANEOUS

## 2025-07-21 DIAGNOSIS — M25.539 PAIN IN UNSPECIFIED WRIST: ICD-10-CM

## 2025-07-21 PROCEDURE — 99213 OFFICE O/P EST LOW 20 MIN: CPT

## 2025-07-23 ENCOUNTER — APPOINTMENT (OUTPATIENT)
Dept: NEUROPSYCHOLOGY | Facility: CLINIC | Age: 40
End: 2025-07-23
Payer: OTHER MISCELLANEOUS

## 2025-07-23 PROCEDURE — 90832 PSYTX W PT 30 MINUTES: CPT

## 2025-08-06 ENCOUNTER — APPOINTMENT (OUTPATIENT)
Dept: NEUROPSYCHOLOGY | Facility: CLINIC | Age: 40
End: 2025-08-06

## 2025-08-20 ENCOUNTER — APPOINTMENT (OUTPATIENT)
Dept: NEUROPSYCHOLOGY | Facility: CLINIC | Age: 40
End: 2025-08-20

## 2025-09-03 ENCOUNTER — APPOINTMENT (OUTPATIENT)
Dept: NEUROPSYCHOLOGY | Facility: CLINIC | Age: 40
End: 2025-09-03

## 2025-09-17 ENCOUNTER — APPOINTMENT (OUTPATIENT)
Dept: NEUROPSYCHOLOGY | Facility: CLINIC | Age: 40
End: 2025-09-17